# Patient Record
Sex: MALE | Race: ASIAN | NOT HISPANIC OR LATINO | Employment: UNEMPLOYED | URBAN - METROPOLITAN AREA
[De-identification: names, ages, dates, MRNs, and addresses within clinical notes are randomized per-mention and may not be internally consistent; named-entity substitution may affect disease eponyms.]

---

## 2019-09-10 ENCOUNTER — NON-PROVIDER VISIT (OUTPATIENT)
Dept: URGENT CARE | Facility: CLINIC | Age: 54
End: 2019-09-10

## 2019-09-10 DIAGNOSIS — Z11.1 ENCOUNTER FOR PPD TEST: ICD-10-CM

## 2019-09-10 PROCEDURE — 86580 TB INTRADERMAL TEST: CPT | Performed by: PHYSICIAN ASSISTANT

## 2019-09-13 ENCOUNTER — NON-PROVIDER VISIT (OUTPATIENT)
Dept: URGENT CARE | Facility: CLINIC | Age: 54
End: 2019-09-13

## 2019-09-13 ENCOUNTER — APPOINTMENT (OUTPATIENT)
Dept: RADIOLOGY | Facility: IMAGING CENTER | Age: 54
End: 2019-09-13
Attending: PHYSICIAN ASSISTANT

## 2019-09-13 DIAGNOSIS — R76.11 PPD POSITIVE: ICD-10-CM

## 2019-09-13 LAB — TB WHEAL 3D P 5 TU DIAM: NORMAL MM

## 2019-09-13 PROCEDURE — 71045 X-RAY EXAM CHEST 1 VIEW: CPT | Mod: TC | Performed by: PHYSICIAN ASSISTANT

## 2019-09-13 NOTE — PROGRESS NOTES
Subjective:      Thaddeus Adan is a 54 y.o. male who presents with PPD Reading            HPI    ROS       Objective:     There were no vitals taken for this visit.     Physical Exam            Assessment/Plan:     1. PPD positive    Patient presents today for PPD reading.  Patient is noted to have 60 mm of erythema and induration.  Chest x-ray ordered.  Chest x-ray is without acute cardiopulmonary process.    MAURO Chapman PA-C

## 2019-09-13 NOTE — PROGRESS NOTES
Subjective:      Thaddeus Adan is a 54 y.o. male who presents with PPD Reading            HPI    ROS       Objective:     There were no vitals taken for this visit.     Physical Exam            Assessment/Plan:     1. PPD positive  ***  - DX-CHEST-2 VIEWS; Future  Patient presents today for PPD reading.  Patient is noted to have 60 mm of erythema and induration.  Chest x-ray ordered.  Chest x-ray is without acute cardiopulmonary process.    MANUEL CurrieC

## 2019-09-13 NOTE — NON-PROVIDER
Thaddeus Adan is a 54 y.o. male here for a non-provider visit for PPD reading -- Step 1 of 1.      1.  Resulted in Epic under enter/edit results? Yes   2.  TB evaluation questionnaire scanned into chart and original given to patient?Yes      3. Was induration greater than 0 mm? Yes, verified by Provider: 16mm.

## 2019-09-13 NOTE — PROGRESS NOTES
Subjective:      Thaddeus Adan is a 54 y.o. male who presents with No chief complaint on file.            HPI    ROS       Objective:     There were no vitals taken for this visit.     Physical Exam            Assessment/Plan:     1. PPD positive      Patient presents today for PPD reading.  Patient is noted to have 60 mm of erythema and induration.  Chest x-ray ordered.  - DX-CHEST-2 VIEWS; Future      MANUEL CurrieC

## 2019-12-01 ENCOUNTER — HOSPITAL ENCOUNTER (INPATIENT)
Facility: MEDICAL CENTER | Age: 54
LOS: 3 days | DRG: 897 | End: 2019-12-06
Attending: EMERGENCY MEDICINE | Admitting: INTERNAL MEDICINE

## 2019-12-01 ENCOUNTER — APPOINTMENT (OUTPATIENT)
Dept: RADIOLOGY | Facility: MEDICAL CENTER | Age: 54
DRG: 897 | End: 2019-12-01
Attending: INTERNAL MEDICINE

## 2019-12-01 ENCOUNTER — APPOINTMENT (OUTPATIENT)
Dept: RADIOLOGY | Facility: MEDICAL CENTER | Age: 54
DRG: 897 | End: 2019-12-01
Attending: EMERGENCY MEDICINE

## 2019-12-01 DIAGNOSIS — R73.9 HYPERGLYCEMIA: ICD-10-CM

## 2019-12-01 DIAGNOSIS — R07.9 CHEST PAIN WITH MODERATE RISK FOR CARDIAC ETIOLOGY: ICD-10-CM

## 2019-12-01 DIAGNOSIS — F10.929 ACUTE ALCOHOLIC INTOXICATION WITH COMPLICATION (HCC): ICD-10-CM

## 2019-12-01 LAB
ALBUMIN SERPL BCP-MCNC: 3.6 G/DL (ref 3.2–4.9)
ALBUMIN/GLOB SERPL: 1.2 G/DL
ALP SERPL-CCNC: 115 U/L (ref 30–99)
ALT SERPL-CCNC: 159 U/L (ref 2–50)
ANION GAP SERPL CALC-SCNC: 18 MMOL/L (ref 0–11.9)
AST SERPL-CCNC: 289 U/L (ref 12–45)
BASOPHILS # BLD AUTO: 0.9 % (ref 0–1.8)
BASOPHILS # BLD: 0.05 K/UL (ref 0–0.12)
BILIRUB SERPL-MCNC: 1 MG/DL (ref 0.1–1.5)
BUN SERPL-MCNC: 10 MG/DL (ref 8–22)
CALCIUM SERPL-MCNC: 8.6 MG/DL (ref 8.5–10.5)
CHLORIDE SERPL-SCNC: 92 MMOL/L (ref 96–112)
CO2 SERPL-SCNC: 21 MMOL/L (ref 20–33)
CREAT SERPL-MCNC: 0.92 MG/DL (ref 0.5–1.4)
D DIMER PPP IA.FEU-MCNC: 0.56 UG/ML (FEU) (ref 0–0.5)
EKG IMPRESSION: NORMAL
EOSINOPHIL # BLD AUTO: 0.19 K/UL (ref 0–0.51)
EOSINOPHIL NFR BLD: 3.4 % (ref 0–6.9)
ERYTHROCYTE [DISTWIDTH] IN BLOOD BY AUTOMATED COUNT: 38.3 FL (ref 35.9–50)
ETHANOL BLD-MCNC: 0.29 G/DL
GLOBULIN SER CALC-MCNC: 2.9 G/DL (ref 1.9–3.5)
GLUCOSE BLD-MCNC: 251 MG/DL (ref 65–99)
GLUCOSE SERPL-MCNC: 293 MG/DL (ref 65–99)
HCT VFR BLD AUTO: 37.9 % (ref 42–52)
HGB BLD-MCNC: 13.5 G/DL (ref 14–18)
IMM GRANULOCYTES # BLD AUTO: 0.02 K/UL (ref 0–0.11)
IMM GRANULOCYTES NFR BLD AUTO: 0.4 % (ref 0–0.9)
LYMPHOCYTES # BLD AUTO: 1.37 K/UL (ref 1–4.8)
LYMPHOCYTES NFR BLD: 24.2 % (ref 22–41)
MCH RBC QN AUTO: 33.7 PG (ref 27–33)
MCHC RBC AUTO-ENTMCNC: 35.6 G/DL (ref 33.7–35.3)
MCV RBC AUTO: 94.5 FL (ref 81.4–97.8)
MONOCYTES # BLD AUTO: 0.74 K/UL (ref 0–0.85)
MONOCYTES NFR BLD AUTO: 13.1 % (ref 0–13.4)
NEUTROPHILS # BLD AUTO: 3.3 K/UL (ref 1.82–7.42)
NEUTROPHILS NFR BLD: 58 % (ref 44–72)
NRBC # BLD AUTO: 0 K/UL
NRBC BLD-RTO: 0 /100 WBC
PLATELET # BLD AUTO: 129 K/UL (ref 164–446)
PMV BLD AUTO: 9.4 FL (ref 9–12.9)
POTASSIUM SERPL-SCNC: 3.4 MMOL/L (ref 3.6–5.5)
PROT SERPL-MCNC: 6.5 G/DL (ref 6–8.2)
RBC # BLD AUTO: 4.01 M/UL (ref 4.7–6.1)
SODIUM SERPL-SCNC: 131 MMOL/L (ref 135–145)
TROPONIN T SERPL-MCNC: 10 NG/L (ref 6–19)
TROPONIN T SERPL-MCNC: 14 NG/L (ref 6–19)
WBC # BLD AUTO: 5.7 K/UL (ref 4.8–10.8)

## 2019-12-01 PROCEDURE — 85379 FIBRIN DEGRADATION QUANT: CPT

## 2019-12-01 PROCEDURE — G0378 HOSPITAL OBSERVATION PER HR: HCPCS

## 2019-12-01 PROCEDURE — 99220 PR INITIAL OBSERVATION CARE,LEVL III: CPT | Performed by: INTERNAL MEDICINE

## 2019-12-01 PROCEDURE — HZ2ZZZZ DETOXIFICATION SERVICES FOR SUBSTANCE ABUSE TREATMENT: ICD-10-PCS | Performed by: INTERNAL MEDICINE

## 2019-12-01 PROCEDURE — 99285 EMERGENCY DEPT VISIT HI MDM: CPT

## 2019-12-01 PROCEDURE — 80307 DRUG TEST PRSMV CHEM ANLYZR: CPT

## 2019-12-01 PROCEDURE — 700102 HCHG RX REV CODE 250 W/ 637 OVERRIDE(OP): Performed by: INTERNAL MEDICINE

## 2019-12-01 PROCEDURE — 96372 THER/PROPH/DIAG INJ SC/IM: CPT

## 2019-12-01 PROCEDURE — 80053 COMPREHEN METABOLIC PANEL: CPT

## 2019-12-01 PROCEDURE — 71045 X-RAY EXAM CHEST 1 VIEW: CPT

## 2019-12-01 PROCEDURE — 84484 ASSAY OF TROPONIN QUANT: CPT

## 2019-12-01 PROCEDURE — 80074 ACUTE HEPATITIS PANEL: CPT

## 2019-12-01 PROCEDURE — 96374 THER/PROPH/DIAG INJ IV PUSH: CPT

## 2019-12-01 PROCEDURE — 36415 COLL VENOUS BLD VENIPUNCTURE: CPT

## 2019-12-01 PROCEDURE — 76705 ECHO EXAM OF ABDOMEN: CPT

## 2019-12-01 PROCEDURE — 93005 ELECTROCARDIOGRAM TRACING: CPT

## 2019-12-01 PROCEDURE — 93005 ELECTROCARDIOGRAM TRACING: CPT | Performed by: EMERGENCY MEDICINE

## 2019-12-01 PROCEDURE — 85025 COMPLETE CBC W/AUTO DIFF WBC: CPT

## 2019-12-01 PROCEDURE — 700101 HCHG RX REV CODE 250: Performed by: INTERNAL MEDICINE

## 2019-12-01 PROCEDURE — 93005 ELECTROCARDIOGRAM TRACING: CPT | Performed by: INTERNAL MEDICINE

## 2019-12-01 PROCEDURE — 82962 GLUCOSE BLOOD TEST: CPT

## 2019-12-01 RX ORDER — HEPARIN SODIUM 5000 [USP'U]/ML
5000 INJECTION, SOLUTION INTRAVENOUS; SUBCUTANEOUS EVERY 8 HOURS
Status: DISCONTINUED | OUTPATIENT
Start: 2019-12-01 | End: 2019-12-06 | Stop reason: HOSPADM

## 2019-12-01 RX ORDER — ASPIRIN 81 MG/1
324 TABLET, CHEWABLE ORAL DAILY
Status: DISCONTINUED | OUTPATIENT
Start: 2019-12-02 | End: 2019-12-03

## 2019-12-01 RX ORDER — LOSARTAN POTASSIUM 50 MG/1
50 TABLET ORAL DAILY
Status: ON HOLD | COMMUNITY
End: 2019-12-06

## 2019-12-01 RX ORDER — LOSARTAN POTASSIUM 50 MG/1
50 TABLET ORAL DAILY
Status: DISCONTINUED | OUTPATIENT
Start: 2019-12-02 | End: 2019-12-02

## 2019-12-01 RX ORDER — LORAZEPAM 1 MG/1
0.5 TABLET ORAL EVERY 4 HOURS PRN
Status: DISCONTINUED | OUTPATIENT
Start: 2019-12-01 | End: 2019-12-06 | Stop reason: HOSPADM

## 2019-12-01 RX ORDER — ASPIRIN 325 MG
325 TABLET ORAL DAILY
Status: DISCONTINUED | OUTPATIENT
Start: 2019-12-02 | End: 2019-12-03

## 2019-12-01 RX ORDER — LORAZEPAM 2 MG/ML
1 INJECTION INTRAMUSCULAR
Status: DISCONTINUED | OUTPATIENT
Start: 2019-12-01 | End: 2019-12-06 | Stop reason: HOSPADM

## 2019-12-01 RX ORDER — LORAZEPAM 2 MG/ML
1.5 INJECTION INTRAMUSCULAR
Status: DISCONTINUED | OUTPATIENT
Start: 2019-12-01 | End: 2019-12-06 | Stop reason: HOSPADM

## 2019-12-01 RX ORDER — LORAZEPAM 1 MG/1
1 TABLET ORAL EVERY 4 HOURS PRN
Status: DISCONTINUED | OUTPATIENT
Start: 2019-12-01 | End: 2019-12-06 | Stop reason: HOSPADM

## 2019-12-01 RX ORDER — ASPIRIN 300 MG/1
300 SUPPOSITORY RECTAL DAILY
Status: DISCONTINUED | OUTPATIENT
Start: 2019-12-02 | End: 2019-12-03

## 2019-12-01 RX ORDER — LORAZEPAM 2 MG/ML
0.5 INJECTION INTRAMUSCULAR EVERY 4 HOURS PRN
Status: DISCONTINUED | OUTPATIENT
Start: 2019-12-01 | End: 2019-12-06 | Stop reason: HOSPADM

## 2019-12-01 RX ORDER — THIAMINE MONONITRATE (VIT B1) 100 MG
100 TABLET ORAL DAILY
Status: COMPLETED | OUTPATIENT
Start: 2019-12-02 | End: 2019-12-05

## 2019-12-01 RX ORDER — LOSARTAN POTASSIUM 25 MG/1
25 TABLET ORAL ONCE
Status: COMPLETED | OUTPATIENT
Start: 2019-12-02 | End: 2019-12-02

## 2019-12-01 RX ORDER — FOLIC ACID 1 MG/1
1 TABLET ORAL DAILY
Status: COMPLETED | OUTPATIENT
Start: 2019-12-02 | End: 2019-12-05

## 2019-12-01 RX ORDER — LORAZEPAM 2 MG/1
4 TABLET ORAL
Status: DISCONTINUED | OUTPATIENT
Start: 2019-12-01 | End: 2019-12-06 | Stop reason: HOSPADM

## 2019-12-01 RX ORDER — LORAZEPAM 2 MG/ML
2 INJECTION INTRAMUSCULAR
Status: DISCONTINUED | OUTPATIENT
Start: 2019-12-01 | End: 2019-12-06 | Stop reason: HOSPADM

## 2019-12-01 RX ORDER — LABETALOL HYDROCHLORIDE 5 MG/ML
10 INJECTION, SOLUTION INTRAVENOUS EVERY 6 HOURS PRN
Status: DISCONTINUED | OUTPATIENT
Start: 2019-12-01 | End: 2019-12-02

## 2019-12-01 RX ORDER — LORAZEPAM 2 MG/1
2 TABLET ORAL
Status: DISCONTINUED | OUTPATIENT
Start: 2019-12-01 | End: 2019-12-06 | Stop reason: HOSPADM

## 2019-12-01 RX ADMIN — INSULIN HUMAN 3 UNITS: 100 INJECTION, SOLUTION PARENTERAL at 21:58

## 2019-12-01 RX ADMIN — THIAMINE HYDROCHLORIDE: 100 INJECTION, SOLUTION INTRAMUSCULAR; INTRAVENOUS at 18:45

## 2019-12-01 SDOH — HEALTH STABILITY: MENTAL HEALTH: HOW OFTEN DO YOU HAVE 6 OR MORE DRINKS ON ONE OCCASION?: PATIENT DECLINED

## 2019-12-01 SDOH — ECONOMIC STABILITY: TRANSPORTATION INSECURITY
IN THE PAST 12 MONTHS, HAS LACK OF TRANSPORTATION KEPT YOU FROM MEETINGS, WORK, OR FROM GETTING THINGS NEEDED FOR DAILY LIVING?: PATIENT DECLINED

## 2019-12-01 SDOH — HEALTH STABILITY: MENTAL HEALTH: HOW MANY STANDARD DRINKS CONTAINING ALCOHOL DO YOU HAVE ON A TYPICAL DAY?: PATIENT DECLINED

## 2019-12-01 SDOH — HEALTH STABILITY: MENTAL HEALTH: HOW OFTEN DO YOU HAVE A DRINK CONTAINING ALCOHOL?: PATIENT DECLINED

## 2019-12-01 SDOH — ECONOMIC STABILITY: FOOD INSECURITY: WITHIN THE PAST 12 MONTHS, YOU WORRIED THAT YOUR FOOD WOULD RUN OUT BEFORE YOU GOT MONEY TO BUY MORE.: PATIENT DECLINED

## 2019-12-01 SDOH — ECONOMIC STABILITY: TRANSPORTATION INSECURITY
IN THE PAST 12 MONTHS, HAS THE LACK OF TRANSPORTATION KEPT YOU FROM MEDICAL APPOINTMENTS OR FROM GETTING MEDICATIONS?: PATIENT DECLINED

## 2019-12-01 SDOH — ECONOMIC STABILITY: FOOD INSECURITY: WITHIN THE PAST 12 MONTHS, THE FOOD YOU BOUGHT JUST DIDN'T LAST AND YOU DIDN'T HAVE MONEY TO GET MORE.: PATIENT DECLINED

## 2019-12-01 ASSESSMENT — LIFESTYLE VARIABLES
AGITATION: NORMAL ACTIVITY
AVERAGE NUMBER OF DAYS PER WEEK YOU HAVE A DRINK CONTAINING ALCOHOL: 3
HAVE YOU EVER FELT YOU SHOULD CUT DOWN ON YOUR DRINKING: YES
EVER FELT BAD OR GUILTY ABOUT YOUR DRINKING: YES
ALCOHOL_USE: YES
TOTAL SCORE: 2
HEADACHE, FULLNESS IN HEAD: NOT PRESENT
NAUSEA AND VOMITING: NO NAUSEA AND NO VOMITING
EVER_SMOKED: YES
DOES PATIENT WANT TO STOP DRINKING: YES
HOW MANY TIMES IN THE PAST YEAR HAVE YOU HAD 5 OR MORE DRINKS IN A DAY: 30
PACK_YEARS: 25
PAROXYSMAL SWEATS: NO SWEAT VISIBLE
ORIENTATION AND CLOUDING OF SENSORIUM: ORIENTED AND CAN DO SERIAL ADDITIONS
TOTAL SCORE: 2
TOTAL SCORE: 1
EVER HAD A DRINK FIRST THING IN THE MORNING TO STEADY YOUR NERVES TO GET RID OF A HANGOVER: NO
TOTAL SCORE: 2
ANXIETY: MILDLY ANXIOUS
TREMOR: NO TREMOR
VISUAL DISTURBANCES: NOT PRESENT
CONSUMPTION TOTAL: POSITIVE
ON A TYPICAL DAY WHEN YOU DRINK ALCOHOL HOW MANY DRINKS DO YOU HAVE: 12
HAVE PEOPLE ANNOYED YOU BY CRITICIZING YOUR DRINKING: NO
AUDITORY DISTURBANCES: NOT PRESENT
DOES PATIENT WANT TO TALK TO SOMEONE ABOUT QUITTING: YES

## 2019-12-01 ASSESSMENT — COPD QUESTIONNAIRES
DO YOU EVER COUGH UP ANY MUCUS OR PHLEGM?: NO/ONLY WITH OCCASIONAL COLDS OR INFECTIONS
IN THE PAST 12 MONTHS DO YOU DO LESS THAN YOU USED TO BECAUSE OF YOUR BREATHING PROBLEMS: DISAGREE/UNSURE
HAVE YOU SMOKED AT LEAST 100 CIGARETTES IN YOUR ENTIRE LIFE: NO/DON'T KNOW
COPD SCREENING SCORE: 1
DURING THE PAST 4 WEEKS HOW MUCH DID YOU FEEL SHORT OF BREATH: NONE/LITTLE OF THE TIME

## 2019-12-01 ASSESSMENT — COGNITIVE AND FUNCTIONAL STATUS - GENERAL
DAILY ACTIVITIY SCORE: 24
SUGGESTED CMS G CODE MODIFIER MOBILITY: CH
SUGGESTED CMS G CODE MODIFIER DAILY ACTIVITY: CH
MOBILITY SCORE: 24

## 2019-12-01 ASSESSMENT — PATIENT HEALTH QUESTIONNAIRE - PHQ9
7. TROUBLE CONCENTRATING ON THINGS, SUCH AS READING THE NEWSPAPER OR WATCHING TELEVISION: NOT AT ALL
SUM OF ALL RESPONSES TO PHQ9 QUESTIONS 1 AND 2: 2
4. FEELING TIRED OR HAVING LITTLE ENERGY: NOT AT ALL
8. MOVING OR SPEAKING SO SLOWLY THAT OTHER PEOPLE COULD HAVE NOTICED. OR THE OPPOSITE, BEING SO FIGETY OR RESTLESS THAT YOU HAVE BEEN MOVING AROUND A LOT MORE THAN USUAL: NOT AT ALL
9. THOUGHTS THAT YOU WOULD BE BETTER OFF DEAD, OR OF HURTING YOURSELF: NOT AT ALL
3. TROUBLE FALLING OR STAYING ASLEEP OR SLEEPING TOO MUCH: NOT AT ALL
SUM OF ALL RESPONSES TO PHQ QUESTIONS 1-9: 3
5. POOR APPETITE OR OVEREATING: NOT AT ALL
6. FEELING BAD ABOUT YOURSELF - OR THAT YOU ARE A FAILURE OR HAVE LET YOURSELF OR YOUR FAMILY DOWN: SEVERAL DAYS
2. FEELING DOWN, DEPRESSED, IRRITABLE, OR HOPELESS: SEVERAL DAYS
1. LITTLE INTEREST OR PLEASURE IN DOING THINGS: SEVERAL DAYS

## 2019-12-01 ASSESSMENT — PAIN DESCRIPTION - DESCRIPTORS: DESCRIPTORS: BURNING

## 2019-12-01 NOTE — ED TRIAGE NOTES
Pt presents to ED via EMS from [facility] for chest pain x 1 hour. 324 asa and 0.4 nitro was given. Pain is now 1/10. Pt refusing to answer any questions at this time. Pt will only point to mouth and ask for water. Pt will nod yes and no to basic questions. Limited assessment obtainable.

## 2019-12-02 ENCOUNTER — APPOINTMENT (OUTPATIENT)
Dept: CARDIOLOGY | Facility: MEDICAL CENTER | Age: 54
DRG: 897 | End: 2019-12-02
Attending: INTERNAL MEDICINE

## 2019-12-02 ENCOUNTER — APPOINTMENT (OUTPATIENT)
Dept: RADIOLOGY | Facility: MEDICAL CENTER | Age: 54
DRG: 897 | End: 2019-12-02
Attending: INTERNAL MEDICINE

## 2019-12-02 PROBLEM — R73.9 HYPERGLYCEMIA: Status: ACTIVE | Noted: 2019-12-02

## 2019-12-02 PROBLEM — R74.8 ELEVATED LIVER ENZYMES: Status: ACTIVE | Noted: 2019-12-02

## 2019-12-02 PROBLEM — R07.9 CHEST PAIN: Status: ACTIVE | Noted: 2019-12-02

## 2019-12-02 PROBLEM — F10.10 ALCOHOL ABUSE: Status: ACTIVE | Noted: 2019-12-02

## 2019-12-02 LAB
EKG IMPRESSION: NORMAL
EKG IMPRESSION: NORMAL
EST. AVERAGE GLUCOSE BLD GHB EST-MCNC: 243 MG/DL
GLUCOSE BLD-MCNC: 242 MG/DL (ref 65–99)
GLUCOSE BLD-MCNC: 244 MG/DL (ref 65–99)
GLUCOSE BLD-MCNC: 257 MG/DL (ref 65–99)
GLUCOSE BLD-MCNC: 259 MG/DL (ref 65–99)
HAV IGM SERPL QL IA: NEGATIVE
HBA1C MFR BLD: 10.1 % (ref 0–5.6)
HBV CORE IGM SER QL: NEGATIVE
HBV SURFACE AG SER QL: NEGATIVE
HCV AB SER QL: NEGATIVE
LIPASE SERPL-CCNC: 62 U/L (ref 11–82)
LV EJECT FRACT  99904: 65
LV EJECT FRACT MOD 2C 99903: 69.34
LV EJECT FRACT MOD 4C 99902: 67.55
LV EJECT FRACT MOD BP 99901: 69.05
MAGNESIUM SERPL-MCNC: 1.5 MG/DL (ref 1.5–2.5)
PHOSPHATE SERPL-MCNC: 2.3 MG/DL (ref 2.5–4.5)

## 2019-12-02 PROCEDURE — A9270 NON-COVERED ITEM OR SERVICE: HCPCS | Performed by: INTERNAL MEDICINE

## 2019-12-02 PROCEDURE — G0378 HOSPITAL OBSERVATION PER HR: HCPCS

## 2019-12-02 PROCEDURE — 700102 HCHG RX REV CODE 250 W/ 637 OVERRIDE(OP): Performed by: INTERNAL MEDICINE

## 2019-12-02 PROCEDURE — 83735 ASSAY OF MAGNESIUM: CPT

## 2019-12-02 PROCEDURE — 82962 GLUCOSE BLOOD TEST: CPT | Mod: 91

## 2019-12-02 PROCEDURE — 96375 TX/PRO/DX INJ NEW DRUG ADDON: CPT

## 2019-12-02 PROCEDURE — 83036 HEMOGLOBIN GLYCOSYLATED A1C: CPT

## 2019-12-02 PROCEDURE — 700111 HCHG RX REV CODE 636 W/ 250 OVERRIDE (IP): Performed by: INTERNAL MEDICINE

## 2019-12-02 PROCEDURE — 99226 PR SUBSEQUENT OBSERVATION CARE,LEVEL III: CPT | Performed by: INTERNAL MEDICINE

## 2019-12-02 PROCEDURE — 93010 ELECTROCARDIOGRAM REPORT: CPT | Performed by: INTERNAL MEDICINE

## 2019-12-02 PROCEDURE — 700105 HCHG RX REV CODE 258: Performed by: INTERNAL MEDICINE

## 2019-12-02 PROCEDURE — 84100 ASSAY OF PHOSPHORUS: CPT

## 2019-12-02 PROCEDURE — 93010 ELECTROCARDIOGRAM REPORT: CPT | Mod: 76 | Performed by: INTERNAL MEDICINE

## 2019-12-02 PROCEDURE — 93306 TTE W/DOPPLER COMPLETE: CPT

## 2019-12-02 PROCEDURE — 36415 COLL VENOUS BLD VENIPUNCTURE: CPT

## 2019-12-02 PROCEDURE — 93306 TTE W/DOPPLER COMPLETE: CPT | Mod: 26 | Performed by: INTERNAL MEDICINE

## 2019-12-02 PROCEDURE — 83690 ASSAY OF LIPASE: CPT

## 2019-12-02 PROCEDURE — 93005 ELECTROCARDIOGRAM TRACING: CPT | Performed by: INTERNAL MEDICINE

## 2019-12-02 PROCEDURE — 96372 THER/PROPH/DIAG INJ SC/IM: CPT

## 2019-12-02 RX ORDER — OMEPRAZOLE 20 MG/1
20 CAPSULE, DELAYED RELEASE ORAL DAILY
Status: DISCONTINUED | OUTPATIENT
Start: 2019-12-02 | End: 2019-12-06 | Stop reason: HOSPADM

## 2019-12-02 RX ORDER — LOSARTAN POTASSIUM 50 MG/1
100 TABLET ORAL DAILY
Status: DISCONTINUED | OUTPATIENT
Start: 2019-12-03 | End: 2019-12-06 | Stop reason: HOSPADM

## 2019-12-02 RX ORDER — SODIUM CHLORIDE, SODIUM LACTATE, POTASSIUM CHLORIDE, CALCIUM CHLORIDE 600; 310; 30; 20 MG/100ML; MG/100ML; MG/100ML; MG/100ML
INJECTION, SOLUTION INTRAVENOUS CONTINUOUS
Status: DISCONTINUED | OUTPATIENT
Start: 2019-12-02 | End: 2019-12-05

## 2019-12-02 RX ORDER — ENALAPRILAT 1.25 MG/ML
1.25 INJECTION INTRAVENOUS EVERY 6 HOURS PRN
Status: DISCONTINUED | OUTPATIENT
Start: 2019-12-02 | End: 2019-12-06 | Stop reason: HOSPADM

## 2019-12-02 RX ADMIN — SODIUM CHLORIDE, POTASSIUM CHLORIDE, SODIUM LACTATE AND CALCIUM CHLORIDE: 600; 310; 30; 20 INJECTION, SOLUTION INTRAVENOUS at 02:35

## 2019-12-02 RX ADMIN — FOLIC ACID 1 MG: 1 TABLET ORAL at 05:21

## 2019-12-02 RX ADMIN — OMEPRAZOLE 20 MG: 20 CAPSULE, DELAYED RELEASE ORAL at 05:21

## 2019-12-02 RX ADMIN — INSULIN HUMAN 3 UNITS: 100 INJECTION, SOLUTION PARENTERAL at 20:25

## 2019-12-02 RX ADMIN — THERA TABS 1 TABLET: TAB at 05:21

## 2019-12-02 RX ADMIN — LOSARTAN POTASSIUM 50 MG: 50 TABLET, FILM COATED ORAL at 05:21

## 2019-12-02 RX ADMIN — HEPARIN SODIUM 5000 UNITS: 5000 INJECTION, SOLUTION INTRAVENOUS; SUBCUTANEOUS at 20:21

## 2019-12-02 RX ADMIN — OMEPRAZOLE 20 MG: 20 CAPSULE, DELAYED RELEASE ORAL at 02:30

## 2019-12-02 RX ADMIN — ENALAPRILAT 1.25 MG: 1.25 INJECTION INTRAVENOUS at 01:40

## 2019-12-02 RX ADMIN — INSULIN HUMAN 3 UNITS: 100 INJECTION, SOLUTION PARENTERAL at 11:54

## 2019-12-02 RX ADMIN — HEPARIN SODIUM 5000 UNITS: 5000 INJECTION, SOLUTION INTRAVENOUS; SUBCUTANEOUS at 14:17

## 2019-12-02 RX ADMIN — SODIUM CHLORIDE, POTASSIUM CHLORIDE, SODIUM LACTATE AND CALCIUM CHLORIDE: 600; 310; 30; 20 INJECTION, SOLUTION INTRAVENOUS at 10:50

## 2019-12-02 RX ADMIN — Medication 100 MG: at 05:21

## 2019-12-02 RX ADMIN — DIBASIC SODIUM PHOSPHATE, MONOBASIC POTASSIUM PHOSPHATE AND MONOBASIC SODIUM PHOSPHATE 2 TABLET: 852; 155; 130 TABLET ORAL at 08:46

## 2019-12-02 RX ADMIN — DIBASIC SODIUM PHOSPHATE, MONOBASIC POTASSIUM PHOSPHATE AND MONOBASIC SODIUM PHOSPHATE 2 TABLET: 852; 155; 130 TABLET ORAL at 17:07

## 2019-12-02 RX ADMIN — LIDOCAINE HYDROCHLORIDE 30 ML: 20 SOLUTION OROPHARYNGEAL at 02:30

## 2019-12-02 RX ADMIN — DIBASIC SODIUM PHOSPHATE, MONOBASIC POTASSIUM PHOSPHATE AND MONOBASIC SODIUM PHOSPHATE 2 TABLET: 852; 155; 130 TABLET ORAL at 11:54

## 2019-12-02 RX ADMIN — SODIUM CHLORIDE, POTASSIUM CHLORIDE, SODIUM LACTATE AND CALCIUM CHLORIDE: 600; 310; 30; 20 INJECTION, SOLUTION INTRAVENOUS at 20:23

## 2019-12-02 RX ADMIN — LORAZEPAM 0.5 MG: 1 TABLET ORAL at 17:24

## 2019-12-02 RX ADMIN — INSULIN HUMAN 2 UNITS: 100 INJECTION, SOLUTION PARENTERAL at 17:10

## 2019-12-02 RX ADMIN — LORAZEPAM 1 MG: 1 TABLET ORAL at 20:22

## 2019-12-02 RX ADMIN — LOSARTAN POTASSIUM 25 MG: 25 TABLET ORAL at 00:05

## 2019-12-02 RX ADMIN — ASPIRIN 325 MG: 325 TABLET, FILM COATED ORAL at 05:21

## 2019-12-02 RX ADMIN — LORAZEPAM 1 MG: 1 TABLET ORAL at 14:16

## 2019-12-02 RX ADMIN — INSULIN HUMAN 2 UNITS: 100 INJECTION, SOLUTION PARENTERAL at 05:17

## 2019-12-02 ASSESSMENT — ENCOUNTER SYMPTOMS
ABDOMINAL PAIN: 0
TINGLING: 0
PALPITATIONS: 0
SPUTUM PRODUCTION: 0
CONSTIPATION: 0
HEADACHES: 0
ORTHOPNEA: 0
BACK PAIN: 0
DEPRESSION: 1
HALLUCINATIONS: 0
TREMORS: 0
EYE PAIN: 0
MYALGIAS: 0
PHOTOPHOBIA: 0
CHILLS: 0
VOMITING: 0
BLURRED VISION: 0
WEIGHT LOSS: 0
SPEECH CHANGE: 0
SHORTNESS OF BREATH: 1
NECK PAIN: 0
DIZZINESS: 0
SHORTNESS OF BREATH: 0
WEAKNESS: 1
DIARRHEA: 0
FOCAL WEAKNESS: 0
NAUSEA: 0
SENSORY CHANGE: 0
NERVOUS/ANXIOUS: 1
FEVER: 0
COUGH: 0
DOUBLE VISION: 0

## 2019-12-02 ASSESSMENT — LIFESTYLE VARIABLES
NAUSEA AND VOMITING: NO NAUSEA AND NO VOMITING
ANXIETY: *
ORIENTATION AND CLOUDING OF SENSORIUM: ORIENTED AND CAN DO SERIAL ADDITIONS
ANXIETY: MILDLY ANXIOUS
AGITATION: SOMEWHAT MORE THAN NORMAL ACTIVITY
NAUSEA AND VOMITING: NO NAUSEA AND NO VOMITING
AUDITORY DISTURBANCES: NOT PRESENT
NAUSEA AND VOMITING: MILD NAUSEA WITH NO VOMITING
SUBSTANCE_ABUSE: 1
AUDITORY DISTURBANCES: NOT PRESENT
NAUSEA AND VOMITING: NO NAUSEA AND NO VOMITING
ORIENTATION AND CLOUDING OF SENSORIUM: ORIENTED AND CAN DO SERIAL ADDITIONS
HEADACHE, FULLNESS IN HEAD: NOT PRESENT
ANXIETY: MILDLY ANXIOUS
AUDITORY DISTURBANCES: NOT PRESENT
PAROXYSMAL SWEATS: BARELY PERCEPTIBLE SWEATING. PALMS MOIST
TOTAL SCORE: 1
PAROXYSMAL SWEATS: NO SWEAT VISIBLE
ORIENTATION AND CLOUDING OF SENSORIUM: ORIENTED AND CAN DO SERIAL ADDITIONS
HEADACHE, FULLNESS IN HEAD: NOT PRESENT
PAROXYSMAL SWEATS: *
HEADACHE, FULLNESS IN HEAD: NOT PRESENT
VISUAL DISTURBANCES: NOT PRESENT
NAUSEA AND VOMITING: *
AUDITORY DISTURBANCES: VERY MILD HARSHNESS OR ABILITY TO FRIGHTEN
NAUSEA AND VOMITING: *
ANXIETY: NO ANXIETY (AT EASE)
AUDITORY DISTURBANCES: NOT PRESENT
VISUAL DISTURBANCES: NOT PRESENT
TOTAL SCORE: 10
ORIENTATION AND CLOUDING OF SENSORIUM: ORIENTED AND CAN DO SERIAL ADDITIONS
TOTAL SCORE: 4
AUDITORY DISTURBANCES: NOT PRESENT
ANXIETY: *
HEADACHE, FULLNESS IN HEAD: NOT PRESENT
AGITATION: NORMAL ACTIVITY
ANXIETY: MILDLY ANXIOUS
VISUAL DISTURBANCES: VERY MILD SENSITIVITY
TREMOR: *
TOTAL SCORE: 4
ORIENTATION AND CLOUDING OF SENSORIUM: ORIENTED AND CAN DO SERIAL ADDITIONS
TREMOR: TREMOR NOT VISIBLE BUT CAN BE FELT, FINGERTIP TO FINGERTIP
ANXIETY: MILDLY ANXIOUS
HEADACHE, FULLNESS IN HEAD: NOT PRESENT
AGITATION: NORMAL ACTIVITY
AGITATION: *
TOTAL SCORE: 1
PAROXYSMAL SWEATS: BARELY PERCEPTIBLE SWEATING. PALMS MOIST
HEADACHE, FULLNESS IN HEAD: NOT PRESENT
PAROXYSMAL SWEATS: BARELY PERCEPTIBLE SWEATING. PALMS MOIST
TREMOR: *
TREMOR: NO TREMOR
VISUAL DISTURBANCES: NOT PRESENT
AGITATION: NORMAL ACTIVITY
AUDITORY DISTURBANCES: NOT PRESENT
HEADACHE, FULLNESS IN HEAD: NOT PRESENT
VISUAL DISTURBANCES: NOT PRESENT
TREMOR: TREMOR NOT VISIBLE BUT CAN BE FELT, FINGERTIP TO FINGERTIP
TREMOR: TREMOR NOT VISIBLE BUT CAN BE FELT, FINGERTIP TO FINGERTIP
TOTAL SCORE: 6
TOTAL SCORE: 10
ORIENTATION AND CLOUDING OF SENSORIUM: ORIENTED AND CAN DO SERIAL ADDITIONS
PAROXYSMAL SWEATS: *
AGITATION: NORMAL ACTIVITY
PAROXYSMAL SWEATS: NO SWEAT VISIBLE
AGITATION: NORMAL ACTIVITY
VISUAL DISTURBANCES: MILD SENSITIVITY
VISUAL DISTURBANCES: NOT PRESENT
ORIENTATION AND CLOUDING OF SENSORIUM: ORIENTED AND CAN DO SERIAL ADDITIONS
NAUSEA AND VOMITING: MILD NAUSEA WITH NO VOMITING
TREMOR: NO TREMOR

## 2019-12-02 NOTE — CARE PLAN
Problem: Safety  Goal: Will remain free from injury  Outcome: PROGRESSING AS EXPECTED  Goal: Will remain free from falls  Outcome: PROGRESSING AS EXPECTED   Fall precautions in place. Bed locked and in lowest position. Call light and personal belongings within reach. Pt verbalized understanding of call light use.   Problem: Knowledge Deficit  Goal: Knowledge of disease process/condition, treatment plan, diagnostic tests, and medications will improve  Outcome: PROGRESSING AS EXPECTED   Updated pt on POC. Pt verbalized understanding.

## 2019-12-02 NOTE — PROGRESS NOTES
Updated Dr. Smith on patient's status.      Patient initially complaining of 2-4/10 CP, EKG completed (Negative), 2nd trop drawn 14, patient resting now.  Slightly hypertensive.      Patient will be given BP medications, Per Dr. Smith.      Juan Porras

## 2019-12-02 NOTE — PROGRESS NOTES
Hospital Medicine Daily Progress Note    Date of Service  12/2/2019    Chief Complaint  54 y.o. male admitted 12/1/2019 with chest pain and ETOH WD    Hospital Course    see below      Interval Problem Update  CP-mild lower mid sternal pain. NO issues on tele. Trops are ok. Patient refuses stress test today due to fatigue.    ETOH WD-CIWA scores range from 5-10. No other new issues. Lyte abnormalities noted.     Consultants/Specialty  none    Code Status  fcfc    Disposition  TELE    Review of Systems  Review of Systems   Constitutional: Positive for malaise/fatigue. Negative for chills and fever.   Respiratory: Negative for shortness of breath.    Cardiovascular: Positive for chest pain. Negative for palpitations and leg swelling.   Gastrointestinal: Negative for abdominal pain and nausea.   Genitourinary: Negative for dysuria and urgency.   Neurological: Positive for weakness. Negative for tremors.   Psychiatric/Behavioral: Positive for depression.   All other systems reviewed and are negative.       Physical Exam  Temp:  [36.5 °C (97.7 °F)-38 °C (100.4 °F)] 37.2 °C (99 °F)  Pulse:  [] 120  Resp:  [15-20] 20  BP: (136-181)/() 166/93  SpO2:  [92 %-97 %] 94 %    Physical Exam  Vitals signs and nursing note reviewed.   Constitutional:       General: He is not in acute distress.     Appearance: He is well-developed.      Comments: Lethargic, but responds appropriately to questions  Feels tired and fatigued   HENT:      Head: Normocephalic and atraumatic.      Mouth/Throat:      Pharynx: No oropharyngeal exudate.   Eyes:      General: No scleral icterus.     Pupils: Pupils are equal, round, and reactive to light.   Neck:      Musculoskeletal: Normal range of motion and neck supple.      Thyroid: No thyromegaly.   Cardiovascular:      Rate and Rhythm: Regular rhythm. Tachycardia present.      Heart sounds: Normal heart sounds. No murmur.   Pulmonary:      Effort: Pulmonary effort is normal. No respiratory  distress.      Breath sounds: Rales present. No wheezing.   Abdominal:      General: Bowel sounds are normal. There is no distension.      Palpations: Abdomen is soft.      Tenderness: There is tenderness (lower sternal area).   Musculoskeletal: Normal range of motion.         General: No tenderness.   Skin:     General: Skin is warm and dry.      Findings: No rash.   Neurological:      Mental Status: He is alert and oriented to person, place, and time.      Cranial Nerves: No cranial nerve deficit.         Fluids    Intake/Output Summary (Last 24 hours) at 12/2/2019 1423  Last data filed at 12/2/2019 0802  Gross per 24 hour   Intake 2459.58 ml   Output --   Net 2459.58 ml       Laboratory  Recent Labs     12/01/19  1547   WBC 5.7   RBC 4.01*   HEMOGLOBIN 13.5*   HEMATOCRIT 37.9*   MCV 94.5   MCH 33.7*   MCHC 35.6*   RDW 38.3   PLATELETCT 129*   MPV 9.4     Recent Labs     12/01/19  1547   SODIUM 131*   POTASSIUM 3.4*   CHLORIDE 92*   CO2 21   GLUCOSE 293*   BUN 10   CREATININE 0.92   CALCIUM 8.6                   Imaging  EC-ECHOCARDIOGRAM COMPLETE W/O CONT         US-RUQ   Final Result      1.  Sludge within the gallbladder. No gallstones or biliary ductal dilatation.      2.  Fatty change of the liver.      3.  Small right upper pole renal cyst.      DX-CHEST-PORTABLE (1 VIEW)   Final Result      No evidence of acute cardiopulmonary process.      NM-CARDIAC STRESS TEST    (Results Pending)        Assessment/Plan  Chest pain- (present on admission)  Assessment & Plan  -remains without Chest pain currently, etiology has a broad differential.   Serial troponin and EKG.  -ECHO pending  -NM cardiac stress test deferred until tomorrow  -no issues on tele, TROPS are ok    Hyperglycemia- (present on admission)  Assessment & Plan  -persistent, ?underlying DM  -start ISS, goal BS less than 180  -check A1c  -change diet to diabetic    Elevated liver enzymes- (present on admission)  Assessment & Plan  -asymptomatic, monitor  and trend daily, avoid obvious hepatotoxins  It most likely secondary to alcohol abuse.  RUQ US only with gallbladder sludge, otherwise unremarkable  -check HEP panel    Alcohol abuse- (present on admission)  Assessment & Plan  -continue CIWA, scores are up to 10 now  -lytes are abnormal, replace K+ & PO4(-) aggressively  -monitor closely  -MVI/thiamine, folate         VTE prophylaxis: heparin

## 2019-12-02 NOTE — PROGRESS NOTES
"Page out to Hospitalist Dr. Carlitos Ingram:    Patient 4/10 epigastric pain, 2nd EKG negative.      BP (!) 167/94   Pulse (!) 119   Temp 38 °C (100.4 °F) (Oral)   Resp 18   Ht 1.727 m (5' 8\")   Wt 71.9 kg (158 lb 8.2 oz)   SpO2 93%   BMI 24.10 kg/m²     Orders placed.      GI Cocktail, lipase, LR infusion, Omeprazole.      Juan Porras    "

## 2019-12-02 NOTE — ASSESSMENT & PLAN NOTE
-asymptomatic, monitor and trend daily, avoid obvious hepatotoxins  It most likely secondary to alcohol abuse.  RUQ US only with gallbladder sludge, otherwise unremarkable  -check HEP panel-negative  -LFTs continue to improve with ETOH cessation

## 2019-12-02 NOTE — ED PROVIDER NOTES
ED Provider Note    HPI: Patient is a poor historian.  I cannot determine whether this is primarily due to language issues hernandez simply did not wish to cooperate very much with history.  From what I can determine [place]  The patient was given aspirin and nitroglycerin but no narcotics by paramedics.  On arrival here the patient states his pain is now essentially gone.  Was requesting water.  He denied nausea but had some shortness of breath.  Is had no vomiting.  No fever no leg pain no leg swelling.  As best as I can determine the patient has no other somatic complaints.  The patient appeared to be fairly intoxicated.    Review of Systems: Patient declined to cooperate with review of systems.    Past medical/surgical history: Diabetes    Medications: Patient cannot tell me if he is taking any medications.    Allergies: None    Social History: Positive for alcohol use denies drug use      Physical exam: Constitutional: Somewhat disheveled male sleepy but arousable  Vital signs: Temperature 98.6 pulse 10 2 repeat 97 respirations 16 blood pressure 136/78 pulse oximetry 92%  EYES: PERRL, EOMI, Conjunctivae and sclera normal, eyelids normal bilaterally.  Neck: Trachea midline. No cervical masses seen or palpated. Normal range of motion, supple. No meningeal signs elicited.  Cardiac: Regular rate and rhythm. S1-S2 present. No S3 or S4 present. No murmurs, rubs, or gallops heard. No edema or varicosities were seen.   Lungs: Clear to auscultation with good aeration throughout. No wheezes, rales, or rhonchi heard. Patient's chest wall moved symmetrically with each respiratory effort. Patient was not making use of accessory muscles of respiration in breathing.  Abdomen: Soft nontender to palpation. No rebound or guarding elicited. No organomegaly identified. No pulsatile abdominal masses identified.   Musculoskeletal:  no  pain with palpitation or movement of  muscle, bone or joint , no obvious musculoskeletal deformities identified.  Neurologic: alert and awake answers questions appropriately. Moves all four extremities independently, no gross focal abnormalities identified. Normal strength and motor.  Speech somewhat slurred.  Skin: no rash or lesion seen, no palpable dermatologic lesions identified.  Psychiatric: not anxious, delusional, or hallucinating.    Medical decision making: EKG obtained on arrival (interpretation) twelve-lead EKG sinus tachycardia rate 113.  Morphology P waves QRS complexes T waves unremarkable.  No evidence of ST elevation or depression.  R wave progression normal.  Interpreted as an abnormal EKG due to the rate but otherwise unremarkable not showing any evidence of ischemia or dysrhythmia    Laboratory studies obtained (please see lab sheet for all results) significant findings included unremarkable CBC.  Patient is moderately hyperglycemic blood sugar 293 with pseudohyponatremia 131.  Hepatic dysfunction is present with an SGOT of 289 SGPT 159 alkaline phosphatase of 115.  I suspect this is due to alcohol use.  Blood alcohol 0.29.  Troponin normal at 10    Chest x-ray obtained; no acute abnormalities were seen.    Patient be admitted for observation.  While it is encouraging his initial troponin is unremarkable and his EKG does not show much no acute ischemic changes he is diabetic and complained of chest pain associated with shortness of breath.  His signs and symptoms would not seem to go along with either aortic disease or pulmonary embolism.  I would characterize his pain is having at least a moderate likelihood of cardiac etiology.  He will be admitted for serial troponins provocative stress testing and managed for possible alcohol withdrawal.  Care of patient endorsed over to hospitalist service at 5:45 PM    Impression 1) chest pain moderate likelihood of cardiac etiology  2) acute alcohol intoxication  3) hyperglycemia

## 2019-12-02 NOTE — H&P
Hospital Medicine History & Physical Note    Date of Service  12/1/2019    Primary Care Physician  Pcp Pt States None    Consultants  None    Code Status  Full code    Chief Complaint  Chest pain started today    History of Presenting Illness    54 y.o. male with past medical history of diabetes and hypertension who presented to the hospital 12/1/2019 with complaint of left-sided chest pain.  He reported that he was in [facility] and has been drinking alcohol and he noticed severe chest pain that was associated with shortness of breath and alleviated with medication he received no specific aggravating factor.  He denies any prior history of similar type of chest pain and it was associated with shortness of breath.  He reported that he recently moved from [place] 3 months ago and 2 days ago he lost his job.  He has been drinking excessive amount of alcohol and he was unable to provide how much alcohol.  At the time of evaluation he expressed that his chest pain is subsided and he does not have any shortness of breath anymore.  He was very lethargic and was unable to provide complete detailed information.    He is a poor historian as he was unable to provide detailed information.    Review of Systems  Review of Systems   Constitutional: Negative for chills, fever and weight loss.   HENT: Negative for hearing loss and tinnitus.    Eyes: Negative for blurred vision, double vision, photophobia and pain.   Respiratory: Positive for shortness of breath. Negative for cough and sputum production.    Cardiovascular: Positive for chest pain. Negative for palpitations, orthopnea and leg swelling.   Gastrointestinal: Negative for abdominal pain, constipation, diarrhea, nausea and vomiting.   Genitourinary: Negative for dysuria, frequency and urgency.   Musculoskeletal: Negative for back pain, joint pain, myalgias and neck pain.   Skin: Negative for rash.   Neurological: Negative for dizziness, tingling, tremors, sensory change,  speech change, focal weakness and headaches.   Psychiatric/Behavioral: Positive for substance abuse. Negative for hallucinations. The patient is nervous/anxious.    All other systems reviewed and are negative.      Past Medical History   has a past medical history of Diabetes (HCC).    Surgical History  I reviewed with patient he denies any history past surgery    Family History  Reviewed with patient he expressed that he has a family history of diabetes in both side of the family.    Social History   reports current alcohol use. He reports previous drug use.    Allergies  No Known Allergies    Medications  Prior to Admission Medications   Prescriptions Last Dose Informant Patient Reported? Taking?   losartan (COZAAR) 50 MG Tab 11/29/2019 at Unknown time  Yes Yes   Sig: Take 50 mg by mouth every day.   metformin (GLUCOPHAGE) 1000 MG tablet 11/29/2019 at Unknown time  Yes Yes   Sig: Take 1,000 mg by mouth 2 times a day, with meals.   multivitamin (THERAGRAN) Tab 11/29/2019 at Unknown time  Yes Yes   Sig: Take 1 Tab by mouth every day.      Facility-Administered Medications: None       Physical Exam  Temp:  [37 °C (98.6 °F)] 37 °C (98.6 °F)  Pulse:  [] 97  Resp:  [16] 16  BP: (136-141)/(78) 136/78  SpO2:  [92 %] 92 %    Physical Exam  Vitals signs reviewed.   Constitutional:       General: He is not in acute distress.     Comments: Somnolent   HENT:      Head: Normocephalic and atraumatic. No contusion.      Right Ear: External ear normal.      Left Ear: External ear normal.      Nose: Nose normal.      Mouth/Throat:      Mouth: Mucous membranes are dry.      Pharynx: No oropharyngeal exudate.   Eyes:      General:         Right eye: No discharge.         Left eye: No discharge.      Pupils: Pupils are equal, round, and reactive to light.   Neck:      Musculoskeletal: No neck rigidity or muscular tenderness.   Cardiovascular:      Rate and Rhythm: Normal rate and regular rhythm.      Heart sounds: No murmur. No  friction rub. No gallop.    Pulmonary:      Effort: Pulmonary effort is normal.      Breath sounds: No wheezing or rhonchi.   Abdominal:      General: Bowel sounds are normal. There is no distension.      Palpations: Abdomen is soft.      Tenderness: There is no tenderness. There is no rebound.   Musculoskeletal: Normal range of motion.         General: No swelling or tenderness.   Skin:     General: Skin is warm and dry.      Coloration: Skin is not jaundiced.   Neurological:      General: No focal deficit present.      Mental Status: He is alert.      Cranial Nerves: No cranial nerve deficit.      Sensory: No sensory deficit.   Psychiatric:         Mood and Affect: Mood normal.         Laboratory:  Recent Labs     12/01/19  1547   WBC 5.7   RBC 4.01*   HEMOGLOBIN 13.5*   HEMATOCRIT 37.9*   MCV 94.5   MCH 33.7*   MCHC 35.6*   RDW 38.3   PLATELETCT 129*   MPV 9.4     Recent Labs     12/01/19  1547   SODIUM 131*   POTASSIUM 3.4*   CHLORIDE 92*   CO2 21   GLUCOSE 293*   BUN 10   CREATININE 0.92   CALCIUM 8.6     Recent Labs     12/01/19  1547   ALTSGPT 159*   ASTSGOT 289*   ALKPHOSPHAT 115*   TBILIRUBIN 1.0   GLUCOSE 293*         No results for input(s): NTPROBNP in the last 72 hours.      Recent Labs     12/01/19  1547   TROPONINT 10       Urinalysis:    No results found     Imaging:  US-RUQ   Final Result      1.  Sludge within the gallbladder. No gallstones or biliary ductal dilatation.      2.  Fatty change of the liver.      3.  Small right upper pole renal cyst.      DX-CHEST-PORTABLE (1 VIEW)   Final Result      No evidence of acute cardiopulmonary process.      NM-CARDIAC STRESS TEST    (Results Pending)   EC-ECHOCARDIOGRAM COMPLETE W/O CONT    (Results Pending)         Assessment/Plan:  I anticipate this patient is appropriate for observation status at this time.    Chest pain  Assessment & Plan  He presented with symptoms of chest pain that lasted for 20 minutes and it went away.  Symptoms of shortness of  breath has also resolved.  Initial troponin is negative and EKG did not show ST elevation.  Admit to telemetry for close monitoring.  Serial troponin and EKG.  Order echocardiogram.  I ordered d-dimer.  I ordered nuclear medicine stress testing.      Elevated liver enzymes  Assessment & Plan  To have elevated liver enzymes.  He does not have any right upper quadrant tenderness.  It most likely secondary to alcohol abuse.  I ordered ultrasound right upper quadrant.  I ordered acute hepatitis panel.    Alcohol abuse  Assessment & Plan  He has been drinking excessive amount of alcohol.  He was somnolent unable to provide how much alcohol he has been drinking.  His blood alcohol level was elevated on presentation.  Started him on CIWA protocol.  I ordered IV banana bag.  Admit to telemetry for close monitoring.  I discussed plan of care with him.      Chest x-ray on my interpretation did not show any acute cardiopulmonary process.    VTE prophylaxis: Heparin

## 2019-12-02 NOTE — PROGRESS NOTES
Telemetry Summary:    Measurements: .16/.08/.32     Rhythm: ST    Ectopy: PVC    Rate: 102-121 (Tachy 158 with ambulation)     Juan Porras

## 2019-12-02 NOTE — PROGRESS NOTES
Assumed care of patient at bedside report from NOC RN. Updated on POC. Patient currently A & O x 4; on RA; up with one assist; without complaints of acute pain. Call light within reach. Whiteboard updated. Fall precautions in place. Bed locked and in lowest position. All questions answered. No other needs indicated at this time.

## 2019-12-02 NOTE — PROGRESS NOTES
2 RN Skin Check    2 RN skin check complete.   Devices in place: NONE    Confirmed pressure ulcers found on: N/A.  New potential pressure ulcers noted on NO  Wound consult placed N/A.  The following interventions in place Pillows.    2-RN skin assessment completed this shift with JENNI Stevens.  Skin was assessed for abnormalities head to toe and noted in detail skin note.      FACE/NECK: Intact    BACK of HEAD: Intact    EARS: Intact    CHEST: Intact    ABDOMEN: Intact-Distended    BACK: Intact    BUTTOCKS/SACRUM: Intact-Blanching    GROIN: Deferred    ARMS: Intact    LEGS: BL LE Abraisions    FEET: Dry, Flaky, Intact    This completes the 2-RN skin assessment.      Juan Porras

## 2019-12-02 NOTE — CARE PLAN
Problem: Communication  Goal: The ability to communicate needs accurately and effectively will improve  Outcome: PROGRESSING AS EXPECTED  Note:   Discussed POC with patient, patient agrees to participate in POC.  Patient encouraged to use call bell to ring for assistance.  Patient oriented to room, call bell, and bed controls.  Open line of communication established with the patient.         Problem: Safety  Goal: Will remain free from injury  Outcome: PROGRESSING AS EXPECTED  Note:   Instructed patient to use call bell and ring for assistance prior to ambulation.  Non-Skid foot ware in place, bed in low locked position, call bell and personal belongings within reach.         Problem: Infection  Goal: Will remain free from infection  Outcome: PROGRESSING AS EXPECTED  Note:   No Signs of infection      Problem: Bowel/Gastric:  Goal: Normal bowel function is maintained or improved  Outcome: PROGRESSING AS EXPECTED  Note:   ABD Distended, Rounded      Problem: Knowledge Deficit  Goal: Knowledge of disease process/condition, treatment plan, diagnostic tests, and medications will improve  Outcome: PROGRESSING AS EXPECTED  Note:   Discussed plan of care and medications with patient.  Patient verbalizes understandings of treatment regimen.      Patient needs reinforcement, difficult to education.

## 2019-12-02 NOTE — ASSESSMENT & PLAN NOTE
-Normal ECHO and NM cardiac stress test  -likely MSK in nature or ETOH gastritis/acid reflux related

## 2019-12-02 NOTE — PROGRESS NOTES
Received pt report from ED RNNoam.    Pt awake, alert, oriented X 4.  Pt resting in bed. Pt up to the bathroom, SB assist.  Bed in low locked position, call bell at the bedside, tray table & personal belongings within reach. Non-skid footwear intact. White board updated to reflect plan of care for current shift. Pt door tags reviewed. Bed Alarm ON.    Assessed patient’s Neuro Status, Comfort Level, Admission Screen, 2RN Skin Note.    Vitals WNL-Hypertensive.    Tele NSR/ST. Juan Porras

## 2019-12-03 ENCOUNTER — APPOINTMENT (OUTPATIENT)
Dept: RADIOLOGY | Facility: MEDICAL CENTER | Age: 54
DRG: 897 | End: 2019-12-03
Attending: INTERNAL MEDICINE

## 2019-12-03 PROBLEM — E11.9 TYPE 2 DIABETES MELLITUS WITHOUT COMPLICATION, WITHOUT LONG-TERM CURRENT USE OF INSULIN (HCC): Status: ACTIVE | Noted: 2019-12-02

## 2019-12-03 LAB
ALBUMIN SERPL BCP-MCNC: 3.1 G/DL (ref 3.2–4.9)
ALBUMIN/GLOB SERPL: 1.1 G/DL
ALP SERPL-CCNC: 100 U/L (ref 30–99)
ALT SERPL-CCNC: 140 U/L (ref 2–50)
ANION GAP SERPL CALC-SCNC: 12 MMOL/L (ref 0–11.9)
AST SERPL-CCNC: 207 U/L (ref 12–45)
BASOPHILS # BLD AUTO: 1 % (ref 0–1.8)
BASOPHILS # BLD: 0.05 K/UL (ref 0–0.12)
BILIRUB SERPL-MCNC: 1.7 MG/DL (ref 0.1–1.5)
BUN SERPL-MCNC: 9 MG/DL (ref 8–22)
CALCIUM SERPL-MCNC: 8.1 MG/DL (ref 8.5–10.5)
CHLORIDE SERPL-SCNC: 94 MMOL/L (ref 96–112)
CO2 SERPL-SCNC: 25 MMOL/L (ref 20–33)
CREAT SERPL-MCNC: 0.76 MG/DL (ref 0.5–1.4)
EOSINOPHIL # BLD AUTO: 0.29 K/UL (ref 0–0.51)
EOSINOPHIL NFR BLD: 5.6 % (ref 0–6.9)
ERYTHROCYTE [DISTWIDTH] IN BLOOD BY AUTOMATED COUNT: 37 FL (ref 35.9–50)
GLOBULIN SER CALC-MCNC: 2.8 G/DL (ref 1.9–3.5)
GLUCOSE BLD-MCNC: 194 MG/DL (ref 65–99)
GLUCOSE BLD-MCNC: 197 MG/DL (ref 65–99)
GLUCOSE BLD-MCNC: 245 MG/DL (ref 65–99)
GLUCOSE BLD-MCNC: 297 MG/DL (ref 65–99)
GLUCOSE SERPL-MCNC: 163 MG/DL (ref 65–99)
HCT VFR BLD AUTO: 39.1 % (ref 42–52)
HGB BLD-MCNC: 14 G/DL (ref 14–18)
IMM GRANULOCYTES # BLD AUTO: 0.01 K/UL (ref 0–0.11)
IMM GRANULOCYTES NFR BLD AUTO: 0.2 % (ref 0–0.9)
LYMPHOCYTES # BLD AUTO: 1.33 K/UL (ref 1–4.8)
LYMPHOCYTES NFR BLD: 25.7 % (ref 22–41)
MAGNESIUM SERPL-MCNC: 1.3 MG/DL (ref 1.5–2.5)
MCH RBC QN AUTO: 33.5 PG (ref 27–33)
MCHC RBC AUTO-ENTMCNC: 35.8 G/DL (ref 33.7–35.3)
MCV RBC AUTO: 93.5 FL (ref 81.4–97.8)
MONOCYTES # BLD AUTO: 0.61 K/UL (ref 0–0.85)
MONOCYTES NFR BLD AUTO: 11.8 % (ref 0–13.4)
NEUTROPHILS # BLD AUTO: 2.89 K/UL (ref 1.82–7.42)
NEUTROPHILS NFR BLD: 55.7 % (ref 44–72)
NRBC # BLD AUTO: 0 K/UL
NRBC BLD-RTO: 0 /100 WBC
PHOSPHATE SERPL-MCNC: 3.3 MG/DL (ref 2.5–4.5)
PLATELET # BLD AUTO: 110 K/UL (ref 164–446)
PMV BLD AUTO: 9.3 FL (ref 9–12.9)
POTASSIUM SERPL-SCNC: 3.1 MMOL/L (ref 3.6–5.5)
PROT SERPL-MCNC: 5.9 G/DL (ref 6–8.2)
RBC # BLD AUTO: 4.18 M/UL (ref 4.7–6.1)
SODIUM SERPL-SCNC: 131 MMOL/L (ref 135–145)
WBC # BLD AUTO: 5.2 K/UL (ref 4.8–10.8)

## 2019-12-03 PROCEDURE — 700102 HCHG RX REV CODE 250 W/ 637 OVERRIDE(OP): Performed by: INTERNAL MEDICINE

## 2019-12-03 PROCEDURE — 96372 THER/PROPH/DIAG INJ SC/IM: CPT

## 2019-12-03 PROCEDURE — 80053 COMPREHEN METABOLIC PANEL: CPT

## 2019-12-03 PROCEDURE — 700111 HCHG RX REV CODE 636 W/ 250 OVERRIDE (IP): Performed by: INTERNAL MEDICINE

## 2019-12-03 PROCEDURE — 85025 COMPLETE CBC W/AUTO DIFF WBC: CPT

## 2019-12-03 PROCEDURE — 84100 ASSAY OF PHOSPHORUS: CPT

## 2019-12-03 PROCEDURE — 83735 ASSAY OF MAGNESIUM: CPT

## 2019-12-03 PROCEDURE — 770020 HCHG ROOM/CARE - TELE (206)

## 2019-12-03 PROCEDURE — A9270 NON-COVERED ITEM OR SERVICE: HCPCS | Performed by: INTERNAL MEDICINE

## 2019-12-03 PROCEDURE — 700105 HCHG RX REV CODE 258: Performed by: INTERNAL MEDICINE

## 2019-12-03 PROCEDURE — 36415 COLL VENOUS BLD VENIPUNCTURE: CPT

## 2019-12-03 PROCEDURE — 82962 GLUCOSE BLOOD TEST: CPT | Mod: 91

## 2019-12-03 PROCEDURE — 99233 SBSQ HOSP IP/OBS HIGH 50: CPT | Performed by: INTERNAL MEDICINE

## 2019-12-03 RX ORDER — POTASSIUM CHLORIDE 20 MEQ/1
30 TABLET, EXTENDED RELEASE ORAL 3 TIMES DAILY
Status: DISPENSED | OUTPATIENT
Start: 2019-12-03 | End: 2019-12-05

## 2019-12-03 RX ORDER — MAGNESIUM SULFATE HEPTAHYDRATE 40 MG/ML
4 INJECTION, SOLUTION INTRAVENOUS ONCE
Status: COMPLETED | OUTPATIENT
Start: 2019-12-03 | End: 2019-12-03

## 2019-12-03 RX ADMIN — ASPIRIN 325 MG: 325 TABLET, FILM COATED ORAL at 04:38

## 2019-12-03 RX ADMIN — INSULIN HUMAN 3 UNITS: 100 INJECTION, SOLUTION PARENTERAL at 17:53

## 2019-12-03 RX ADMIN — LOSARTAN POTASSIUM 100 MG: 50 TABLET, FILM COATED ORAL at 04:38

## 2019-12-03 RX ADMIN — HEPARIN SODIUM 5000 UNITS: 5000 INJECTION, SOLUTION INTRAVENOUS; SUBCUTANEOUS at 20:00

## 2019-12-03 RX ADMIN — LIDOCAINE HYDROCHLORIDE 30 ML: 20 SOLUTION OROPHARYNGEAL at 19:53

## 2019-12-03 RX ADMIN — THERA TABS 1 TABLET: TAB at 04:38

## 2019-12-03 RX ADMIN — INSULIN HUMAN 1 UNITS: 100 INJECTION, SOLUTION PARENTERAL at 06:11

## 2019-12-03 RX ADMIN — INSULIN HUMAN 2 UNITS: 100 INJECTION, SOLUTION PARENTERAL at 20:00

## 2019-12-03 RX ADMIN — Medication 100 MG: at 04:38

## 2019-12-03 RX ADMIN — HEPARIN SODIUM 5000 UNITS: 5000 INJECTION, SOLUTION INTRAVENOUS; SUBCUTANEOUS at 04:38

## 2019-12-03 RX ADMIN — SODIUM CHLORIDE, POTASSIUM CHLORIDE, SODIUM LACTATE AND CALCIUM CHLORIDE: 600; 310; 30; 20 INJECTION, SOLUTION INTRAVENOUS at 23:44

## 2019-12-03 RX ADMIN — POTASSIUM CHLORIDE 30 MEQ: 1500 TABLET, EXTENDED RELEASE ORAL at 12:34

## 2019-12-03 RX ADMIN — DIBASIC SODIUM PHOSPHATE, MONOBASIC POTASSIUM PHOSPHATE AND MONOBASIC SODIUM PHOSPHATE 2 TABLET: 852; 155; 130 TABLET ORAL at 00:14

## 2019-12-03 RX ADMIN — DIBASIC SODIUM PHOSPHATE, MONOBASIC POTASSIUM PHOSPHATE AND MONOBASIC SODIUM PHOSPHATE 2 TABLET: 852; 155; 130 TABLET ORAL at 04:38

## 2019-12-03 RX ADMIN — SODIUM CHLORIDE, POTASSIUM CHLORIDE, SODIUM LACTATE AND CALCIUM CHLORIDE: 600; 310; 30; 20 INJECTION, SOLUTION INTRAVENOUS at 04:44

## 2019-12-03 RX ADMIN — MAGNESIUM SULFATE IN WATER 4 G: 40 INJECTION, SOLUTION INTRAVENOUS at 12:33

## 2019-12-03 RX ADMIN — DIBASIC SODIUM PHOSPHATE, MONOBASIC POTASSIUM PHOSPHATE AND MONOBASIC SODIUM PHOSPHATE 2 TABLET: 852; 155; 130 TABLET ORAL at 12:33

## 2019-12-03 RX ADMIN — POTASSIUM CHLORIDE 30 MEQ: 1500 TABLET, EXTENDED RELEASE ORAL at 17:54

## 2019-12-03 RX ADMIN — FOLIC ACID 1 MG: 1 TABLET ORAL at 04:38

## 2019-12-03 RX ADMIN — DIBASIC SODIUM PHOSPHATE, MONOBASIC POTASSIUM PHOSPHATE AND MONOBASIC SODIUM PHOSPHATE 2 TABLET: 852; 155; 130 TABLET ORAL at 17:54

## 2019-12-03 RX ADMIN — OMEPRAZOLE 20 MG: 20 CAPSULE, DELAYED RELEASE ORAL at 04:38

## 2019-12-03 ASSESSMENT — LIFESTYLE VARIABLES
AGITATION: NORMAL ACTIVITY
ANXIETY: MILDLY ANXIOUS
TREMOR: NO TREMOR
PAROXYSMAL SWEATS: BARELY PERCEPTIBLE SWEATING. PALMS MOIST
PAROXYSMAL SWEATS: BARELY PERCEPTIBLE SWEATING. PALMS MOIST
TOTAL SCORE: 1
PAROXYSMAL SWEATS: BARELY PERCEPTIBLE SWEATING. PALMS MOIST
ORIENTATION AND CLOUDING OF SENSORIUM: ORIENTED AND CAN DO SERIAL ADDITIONS
HEADACHE, FULLNESS IN HEAD: NOT PRESENT
TREMOR: NO TREMOR
VISUAL DISTURBANCES: NOT PRESENT
AUDITORY DISTURBANCES: NOT PRESENT
VISUAL DISTURBANCES: NOT PRESENT
TREMOR: NO TREMOR
TOTAL SCORE: 2
AGITATION: NORMAL ACTIVITY
NAUSEA AND VOMITING: MILD NAUSEA WITH NO VOMITING
ANXIETY: MILDLY ANXIOUS
HEADACHE, FULLNESS IN HEAD: NOT PRESENT
VISUAL DISTURBANCES: NOT PRESENT
TOTAL SCORE: 3
ORIENTATION AND CLOUDING OF SENSORIUM: ORIENTED AND CAN DO SERIAL ADDITIONS
HEADACHE, FULLNESS IN HEAD: NOT PRESENT
TOTAL SCORE: 4
TOTAL SCORE: 2
PAROXYSMAL SWEATS: BARELY PERCEPTIBLE SWEATING. PALMS MOIST
ORIENTATION AND CLOUDING OF SENSORIUM: ORIENTED AND CAN DO SERIAL ADDITIONS
ANXIETY: MILDLY ANXIOUS
AUDITORY DISTURBANCES: NOT PRESENT
ORIENTATION AND CLOUDING OF SENSORIUM: ORIENTED AND CAN DO SERIAL ADDITIONS
AGITATION: NORMAL ACTIVITY
HEADACHE, FULLNESS IN HEAD: NOT PRESENT
NAUSEA AND VOMITING: MILD NAUSEA WITH NO VOMITING
PAROXYSMAL SWEATS: NO SWEAT VISIBLE
AGITATION: NORMAL ACTIVITY
AGITATION: NORMAL ACTIVITY
AUDITORY DISTURBANCES: NOT PRESENT
HEADACHE, FULLNESS IN HEAD: NOT PRESENT
AUDITORY DISTURBANCES: NOT PRESENT
ANXIETY: MILDLY ANXIOUS
NAUSEA AND VOMITING: NO NAUSEA AND NO VOMITING
TREMOR: TREMOR NOT VISIBLE BUT CAN BE FELT, FINGERTIP TO FINGERTIP
VISUAL DISTURBANCES: NOT PRESENT
TREMOR: TREMOR NOT VISIBLE BUT CAN BE FELT, FINGERTIP TO FINGERTIP
VISUAL DISTURBANCES: NOT PRESENT
PAROXYSMAL SWEATS: BARELY PERCEPTIBLE SWEATING. PALMS MOIST
VISUAL DISTURBANCES: NOT PRESENT
ANXIETY: MILDLY ANXIOUS
HEADACHE, FULLNESS IN HEAD: NOT PRESENT
AUDITORY DISTURBANCES: NOT PRESENT
AUDITORY DISTURBANCES: NOT PRESENT
ANXIETY: NO ANXIETY (AT EASE)
NAUSEA AND VOMITING: NO NAUSEA AND NO VOMITING
AGITATION: NORMAL ACTIVITY
NAUSEA AND VOMITING: MILD NAUSEA WITH NO VOMITING
ORIENTATION AND CLOUDING OF SENSORIUM: ORIENTED AND CAN DO SERIAL ADDITIONS
NAUSEA AND VOMITING: NO NAUSEA AND NO VOMITING
TREMOR: NO TREMOR
TOTAL SCORE: 3
ORIENTATION AND CLOUDING OF SENSORIUM: ORIENTED AND CAN DO SERIAL ADDITIONS

## 2019-12-03 ASSESSMENT — ENCOUNTER SYMPTOMS
VOMITING: 0
FEVER: 0
SHORTNESS OF BREATH: 0
DEPRESSION: 1
PALPITATIONS: 0
COUGH: 0
NAUSEA: 0
ABDOMINAL PAIN: 0
WEAKNESS: 1
TREMORS: 0
TINGLING: 0

## 2019-12-03 NOTE — CARE PLAN
Problem: Communication  Goal: The ability to communicate needs accurately and effectively will improve  Outcome: PROGRESSING AS EXPECTED  Note:   Patient oriented to room and surroundings. Patient educated to communicate needs accurately and effectively. Patient encouraged to vocalize questions and concerns regarding POC. No concerns or questions noted at this time.        Problem: Safety  Goal: Will remain free from falls  Outcome: PROGRESSING AS EXPECTED  Note:   Patient will remain free from falls. Patient educated on importance of calling for assistance when assistance needed. Patient educated on tread socks, belongings within reach, and use of call light. Fall risk wristband on, bed alarm on. Bed in low and locked position. Fall precautions in place.

## 2019-12-03 NOTE — PROGRESS NOTES
Report received. Patient A&O x 4. Reports no pain.  VS Stable. POC discussed, patient verbalized understanding. Belongings and bedside table within reach. Bed in low and locked positions. Fall precautions in place. Patient educated to call for assistance. Hourly rounding in place. No other needs at this time.

## 2019-12-04 LAB
ALBUMIN SERPL BCP-MCNC: 3 G/DL (ref 3.2–4.9)
ALBUMIN/GLOB SERPL: 1 G/DL
ALP SERPL-CCNC: 100 U/L (ref 30–99)
ALT SERPL-CCNC: 117 U/L (ref 2–50)
ANION GAP SERPL CALC-SCNC: 11 MMOL/L (ref 0–11.9)
AST SERPL-CCNC: 139 U/L (ref 12–45)
BILIRUB SERPL-MCNC: 1.6 MG/DL (ref 0.1–1.5)
BUN SERPL-MCNC: 11 MG/DL (ref 8–22)
CALCIUM SERPL-MCNC: 8.4 MG/DL (ref 8.5–10.5)
CHLORIDE SERPL-SCNC: 95 MMOL/L (ref 96–112)
CO2 SERPL-SCNC: 23 MMOL/L (ref 20–33)
CREAT SERPL-MCNC: 0.79 MG/DL (ref 0.5–1.4)
GLOBULIN SER CALC-MCNC: 3.1 G/DL (ref 1.9–3.5)
GLUCOSE BLD-MCNC: 212 MG/DL (ref 65–99)
GLUCOSE BLD-MCNC: 227 MG/DL (ref 65–99)
GLUCOSE BLD-MCNC: 238 MG/DL (ref 65–99)
GLUCOSE BLD-MCNC: 334 MG/DL (ref 65–99)
GLUCOSE SERPL-MCNC: 189 MG/DL (ref 65–99)
MAGNESIUM SERPL-MCNC: 1.9 MG/DL (ref 1.5–2.5)
PHOSPHATE SERPL-MCNC: 3.6 MG/DL (ref 2.5–4.5)
POTASSIUM SERPL-SCNC: 3.7 MMOL/L (ref 3.6–5.5)
PROT SERPL-MCNC: 6.1 G/DL (ref 6–8.2)
SODIUM SERPL-SCNC: 129 MMOL/L (ref 135–145)

## 2019-12-04 PROCEDURE — 700102 HCHG RX REV CODE 250 W/ 637 OVERRIDE(OP): Performed by: INTERNAL MEDICINE

## 2019-12-04 PROCEDURE — 36415 COLL VENOUS BLD VENIPUNCTURE: CPT

## 2019-12-04 PROCEDURE — A9270 NON-COVERED ITEM OR SERVICE: HCPCS | Performed by: INTERNAL MEDICINE

## 2019-12-04 PROCEDURE — 99232 SBSQ HOSP IP/OBS MODERATE 35: CPT | Performed by: INTERNAL MEDICINE

## 2019-12-04 PROCEDURE — 80053 COMPREHEN METABOLIC PANEL: CPT

## 2019-12-04 PROCEDURE — 82962 GLUCOSE BLOOD TEST: CPT

## 2019-12-04 PROCEDURE — 84100 ASSAY OF PHOSPHORUS: CPT

## 2019-12-04 PROCEDURE — 700111 HCHG RX REV CODE 636 W/ 250 OVERRIDE (IP): Performed by: INTERNAL MEDICINE

## 2019-12-04 PROCEDURE — 700105 HCHG RX REV CODE 258: Performed by: INTERNAL MEDICINE

## 2019-12-04 PROCEDURE — 770020 HCHG ROOM/CARE - TELE (206)

## 2019-12-04 PROCEDURE — 83735 ASSAY OF MAGNESIUM: CPT

## 2019-12-04 RX ORDER — INSULIN GLARGINE 100 [IU]/ML
5 INJECTION, SOLUTION SUBCUTANEOUS 2 TIMES DAILY
Status: DISCONTINUED | OUTPATIENT
Start: 2019-12-04 | End: 2019-12-06 | Stop reason: HOSPADM

## 2019-12-04 RX ORDER — AMLODIPINE BESYLATE 10 MG/1
10 TABLET ORAL
Status: DISCONTINUED | OUTPATIENT
Start: 2019-12-04 | End: 2019-12-06 | Stop reason: HOSPADM

## 2019-12-04 RX ADMIN — POTASSIUM CHLORIDE 30 MEQ: 1500 TABLET, EXTENDED RELEASE ORAL at 16:43

## 2019-12-04 RX ADMIN — INSULIN HUMAN 2 UNITS: 100 INJECTION, SOLUTION PARENTERAL at 20:43

## 2019-12-04 RX ADMIN — INSULIN HUMAN 2 UNITS: 100 INJECTION, SOLUTION PARENTERAL at 16:47

## 2019-12-04 RX ADMIN — SODIUM CHLORIDE, POTASSIUM CHLORIDE, SODIUM LACTATE AND CALCIUM CHLORIDE: 600; 310; 30; 20 INJECTION, SOLUTION INTRAVENOUS at 13:35

## 2019-12-04 RX ADMIN — THERA TABS 1 TABLET: TAB at 04:39

## 2019-12-04 RX ADMIN — INSULIN HUMAN 2 UNITS: 100 INJECTION, SOLUTION PARENTERAL at 05:21

## 2019-12-04 RX ADMIN — INSULIN GLARGINE 5 UNITS: 100 INJECTION, SOLUTION SUBCUTANEOUS at 20:42

## 2019-12-04 RX ADMIN — HEPARIN SODIUM 5000 UNITS: 5000 INJECTION, SOLUTION INTRAVENOUS; SUBCUTANEOUS at 14:54

## 2019-12-04 RX ADMIN — DIBASIC SODIUM PHOSPHATE, MONOBASIC POTASSIUM PHOSPHATE AND MONOBASIC SODIUM PHOSPHATE 2 TABLET: 852; 155; 130 TABLET ORAL at 00:08

## 2019-12-04 RX ADMIN — FOLIC ACID 1 MG: 1 TABLET ORAL at 04:39

## 2019-12-04 RX ADMIN — POTASSIUM CHLORIDE 30 MEQ: 1500 TABLET, EXTENDED RELEASE ORAL at 04:39

## 2019-12-04 RX ADMIN — HEPARIN SODIUM 5000 UNITS: 5000 INJECTION, SOLUTION INTRAVENOUS; SUBCUTANEOUS at 20:42

## 2019-12-04 RX ADMIN — LOSARTAN POTASSIUM 100 MG: 50 TABLET, FILM COATED ORAL at 04:39

## 2019-12-04 RX ADMIN — HEPARIN SODIUM 5000 UNITS: 5000 INJECTION, SOLUTION INTRAVENOUS; SUBCUTANEOUS at 04:39

## 2019-12-04 RX ADMIN — OMEPRAZOLE 20 MG: 20 CAPSULE, DELAYED RELEASE ORAL at 04:39

## 2019-12-04 RX ADMIN — Medication 100 MG: at 04:39

## 2019-12-04 RX ADMIN — POTASSIUM CHLORIDE 30 MEQ: 1500 TABLET, EXTENDED RELEASE ORAL at 11:35

## 2019-12-04 RX ADMIN — INSULIN HUMAN 4 UNITS: 100 INJECTION, SOLUTION PARENTERAL at 11:39

## 2019-12-04 RX ADMIN — AMLODIPINE BESYLATE 10 MG: 10 TABLET ORAL at 10:06

## 2019-12-04 ASSESSMENT — ENCOUNTER SYMPTOMS
TINGLING: 0
TREMORS: 0
DEPRESSION: 1
COUGH: 0
SHORTNESS OF BREATH: 0
DIARRHEA: 0
WEAKNESS: 1

## 2019-12-04 ASSESSMENT — LIFESTYLE VARIABLES
TREMOR: NO TREMOR
ORIENTATION AND CLOUDING OF SENSORIUM: ORIENTED AND CAN DO SERIAL ADDITIONS
HEADACHE, FULLNESS IN HEAD: NOT PRESENT
AGITATION: NORMAL ACTIVITY
HEADACHE, FULLNESS IN HEAD: NOT PRESENT
VISUAL DISTURBANCES: NOT PRESENT
NAUSEA AND VOMITING: NO NAUSEA AND NO VOMITING
ANXIETY: NO ANXIETY (AT EASE)
TOTAL SCORE: 0
TREMOR: NO TREMOR
PAROXYSMAL SWEATS: NO SWEAT VISIBLE
AUDITORY DISTURBANCES: NOT PRESENT
ORIENTATION AND CLOUDING OF SENSORIUM: ORIENTED AND CAN DO SERIAL ADDITIONS
VISUAL DISTURBANCES: NOT PRESENT
PAROXYSMAL SWEATS: NO SWEAT VISIBLE
AUDITORY DISTURBANCES: NOT PRESENT
TOTAL SCORE: 0
AGITATION: NORMAL ACTIVITY
ANXIETY: NO ANXIETY (AT EASE)
NAUSEA AND VOMITING: NO NAUSEA AND NO VOMITING

## 2019-12-04 NOTE — CARE PLAN
Problem: Infection  Goal: Will remain free from infection  Outcome: PROGRESSING AS EXPECTED  Note:   Patient will remain free from infection to prevent further complications. Patient educated on the importance of routine hand hygiene to help prevent the spread of infection.       Problem: Knowledge Deficit  Goal: Knowledge of disease process/condition, treatment plan, diagnostic tests, and medications will improve  Outcome: PROGRESSING AS EXPECTED  Note:   Patient updated on POC, medication education provided. Patient encouraged to voice all questions and concerns.

## 2019-12-04 NOTE — PROGRESS NOTES
Pt stable this morning. No needs at this time. Dr currently in with pt now talking about plan. Will continue to monitor.

## 2019-12-04 NOTE — PROGRESS NOTES
Report received. Patient A&O x 4. Reports pain 2/10 in abdomen.  VS Stable. POC discussed, patient verbalized understanding. Belongings and bedside table within reach. Bed in low and locked positions. Fall precautions in place. Patient educated to call for assistance. Hourly rounding in place. No other needs at this time.

## 2019-12-04 NOTE — CARE PLAN
Problem: Safety  Goal: Will remain free from injury  Outcome: PROGRESSING AS EXPECTED   Bed locked and in lowest position. Call light within reach. Bed locked and in lowest position.

## 2019-12-04 NOTE — PROGRESS NOTES
Pt taken down to nuc med for stress test; once down there he refused test so pt brought back to room. Dr Schafer notified.

## 2019-12-04 NOTE — PROGRESS NOTES
Hospital Medicine Daily Progress Note    Date of Service  12/3/2019    Chief Complaint  54 y.o. male admitted 12/1/2019 with chest pain and ETOH WD    Hospital Course    see below      Interval Problem Update  CP-resolved. Patient refused NM cardiac stress test and he would not tell me why. No issues on tele last night.     ETOH WD-CIWA scores are 0-5. He feels he is still withdrawing and endorses being depressed. LFT's are improving.     Consultants/Specialty  none    Code Status  fcfc    Disposition  TELE    Review of Systems  Review of Systems   Constitutional: Positive for malaise/fatigue (not much improvement). Negative for fever.   Respiratory: Negative for cough and shortness of breath.    Cardiovascular: Negative for chest pain, palpitations and leg swelling.   Gastrointestinal: Negative for abdominal pain, nausea and vomiting.   Genitourinary: Negative for dysuria.   Neurological: Positive for weakness. Negative for tingling and tremors.   Psychiatric/Behavioral: Positive for depression.   All other systems reviewed and are negative.       Physical Exam  Temp:  [36.7 °C (98 °F)-37.6 °C (99.7 °F)] 37 °C (98.6 °F)  Pulse:  [] 87  Resp:  [16] 16  BP: (153-169)/() 159/87  SpO2:  [95 %-99 %] 99 %    Physical Exam  Vitals signs and nursing note reviewed.   Constitutional:       General: He is not in acute distress.     Appearance: He is well-developed.      Comments: Sleepy, but easily arousable   HENT:      Head: Normocephalic and atraumatic.      Nose: No congestion.      Mouth/Throat:      Pharynx: No oropharyngeal exudate.   Eyes:      General: No scleral icterus.     Pupils: Pupils are equal, round, and reactive to light.   Neck:      Musculoskeletal: Normal range of motion and neck supple.      Thyroid: No thyromegaly.   Cardiovascular:      Rate and Rhythm: Regular rhythm. Tachycardia present.      Heart sounds: Normal heart sounds. No murmur.   Pulmonary:      Effort: Pulmonary effort is  normal. No respiratory distress.      Breath sounds: Rales present.      Comments: Unchanged aeration today  Abdominal:      General: Bowel sounds are normal. There is no distension.      Palpations: Abdomen is soft.      Tenderness: There is no tenderness.   Musculoskeletal: Normal range of motion.         General: No tenderness.   Skin:     General: Skin is warm and dry.      Findings: No rash.   Neurological:      Mental Status: He is alert and oriented to person, place, and time.      Cranial Nerves: No cranial nerve deficit.         Fluids    Intake/Output Summary (Last 24 hours) at 12/3/2019 1712  Last data filed at 12/3/2019 0400  Gross per 24 hour   Intake 240 ml   Output 1100 ml   Net -860 ml       Laboratory  Recent Labs     12/01/19  1547 12/03/19  0318   WBC 5.7 5.2   RBC 4.01* 4.18*   HEMOGLOBIN 13.5* 14.0   HEMATOCRIT 37.9* 39.1*   MCV 94.5 93.5   MCH 33.7* 33.5*   MCHC 35.6* 35.8*   RDW 38.3 37.0   PLATELETCT 129* 110*   MPV 9.4 9.3     Recent Labs     12/01/19  1547 12/03/19  0318   SODIUM 131* 131*   POTASSIUM 3.4* 3.1*   CHLORIDE 92* 94*   CO2 21 25   GLUCOSE 293* 163*   BUN 10 9   CREATININE 0.92 0.76   CALCIUM 8.6 8.1*                   Imaging  EC-ECHOCARDIOGRAM COMPLETE W/O CONT   Final Result      US-RUQ   Final Result      1.  Sludge within the gallbladder. No gallstones or biliary ductal dilatation.      2.  Fatty change of the liver.      3.  Small right upper pole renal cyst.      DX-CHEST-PORTABLE (1 VIEW)   Final Result      No evidence of acute cardiopulmonary process.           Assessment/Plan  Chest pain- (present on admission)  Assessment & Plan  -remains without Chest pain again, etiology has a broad differential.   Serial troponin and EKG.  -ECHO is normal  -NM cardiac stress test has been refused by patient for unclear reasons  -no issues on tele, TROPS are ok    Type 2 diabetes mellitus without complication, without long-term current use of insulin (HCC)- (present on  admission)  Assessment & Plan  -A1c is 10.1 indicating new diagnosis, will order DM educator  -continue ISS, goal BS less than 180  -start lantus 5 units QHS  -change diet to diabetic    Elevated liver enzymes- (present on admission)  Assessment & Plan  -asymptomatic, monitor and trend daily, avoid obvious hepatotoxins  It most likely secondary to alcohol abuse.  RUQ US only with gallbladder sludge, otherwise unremarkable  -check HEP panel-negative  -LFTs are improving    Alcohol abuse- (present on admission)  Assessment & Plan  -continue CIWA, still actively withdrawing  -lytes are abnormal, replace K+ & PO4(-) aggressively-added back more supplementation  -monitor closely  -MVI/thiamine, folate         VTE prophylaxis: heparin

## 2019-12-05 ENCOUNTER — APPOINTMENT (OUTPATIENT)
Dept: RADIOLOGY | Facility: MEDICAL CENTER | Age: 54
DRG: 897 | End: 2019-12-05
Attending: INTERNAL MEDICINE

## 2019-12-05 ENCOUNTER — PATIENT OUTREACH (OUTPATIENT)
Dept: HEALTH INFORMATION MANAGEMENT | Facility: OTHER | Age: 54
End: 2019-12-05

## 2019-12-05 LAB
ALBUMIN SERPL BCP-MCNC: 3.3 G/DL (ref 3.2–4.9)
ALBUMIN/GLOB SERPL: 1.1 G/DL
ALP SERPL-CCNC: 136 U/L (ref 30–99)
ALT SERPL-CCNC: 126 U/L (ref 2–50)
ANION GAP SERPL CALC-SCNC: 8 MMOL/L (ref 0–11.9)
AST SERPL-CCNC: 138 U/L (ref 12–45)
BILIRUB SERPL-MCNC: 1.7 MG/DL (ref 0.1–1.5)
BUN SERPL-MCNC: 13 MG/DL (ref 8–22)
CALCIUM SERPL-MCNC: 8.8 MG/DL (ref 8.5–10.5)
CHLORIDE SERPL-SCNC: 98 MMOL/L (ref 96–112)
CO2 SERPL-SCNC: 23 MMOL/L (ref 20–33)
CREAT SERPL-MCNC: 0.86 MG/DL (ref 0.5–1.4)
GLOBULIN SER CALC-MCNC: 3 G/DL (ref 1.9–3.5)
GLUCOSE BLD-MCNC: 258 MG/DL (ref 65–99)
GLUCOSE BLD-MCNC: 268 MG/DL (ref 65–99)
GLUCOSE BLD-MCNC: 279 MG/DL (ref 65–99)
GLUCOSE BLD-MCNC: 291 MG/DL (ref 65–99)
GLUCOSE SERPL-MCNC: 207 MG/DL (ref 65–99)
POTASSIUM SERPL-SCNC: 4.3 MMOL/L (ref 3.6–5.5)
PROT SERPL-MCNC: 6.3 G/DL (ref 6–8.2)
SODIUM SERPL-SCNC: 129 MMOL/L (ref 135–145)
TROPONIN T SERPL-MCNC: 7 NG/L (ref 6–19)

## 2019-12-05 PROCEDURE — A9270 NON-COVERED ITEM OR SERVICE: HCPCS | Performed by: INTERNAL MEDICINE

## 2019-12-05 PROCEDURE — 700102 HCHG RX REV CODE 250 W/ 637 OVERRIDE(OP): Performed by: INTERNAL MEDICINE

## 2019-12-05 PROCEDURE — 84484 ASSAY OF TROPONIN QUANT: CPT

## 2019-12-05 PROCEDURE — 80053 COMPREHEN METABOLIC PANEL: CPT

## 2019-12-05 PROCEDURE — 36415 COLL VENOUS BLD VENIPUNCTURE: CPT

## 2019-12-05 PROCEDURE — 700105 HCHG RX REV CODE 258: Performed by: INTERNAL MEDICINE

## 2019-12-05 PROCEDURE — 700111 HCHG RX REV CODE 636 W/ 250 OVERRIDE (IP): Performed by: INTERNAL MEDICINE

## 2019-12-05 PROCEDURE — 99232 SBSQ HOSP IP/OBS MODERATE 35: CPT | Performed by: INTERNAL MEDICINE

## 2019-12-05 PROCEDURE — 700111 HCHG RX REV CODE 636 W/ 250 OVERRIDE (IP)

## 2019-12-05 PROCEDURE — A9502 TC99M TETROFOSMIN: HCPCS

## 2019-12-05 PROCEDURE — 770020 HCHG ROOM/CARE - TELE (206)

## 2019-12-05 PROCEDURE — 82962 GLUCOSE BLOOD TEST: CPT

## 2019-12-05 RX ORDER — REGADENOSON 0.08 MG/ML
INJECTION, SOLUTION INTRAVENOUS
Status: COMPLETED
Start: 2019-12-05 | End: 2019-12-05

## 2019-12-05 RX ORDER — REGADENOSON 0.08 MG/ML
0.4 INJECTION, SOLUTION INTRAVENOUS ONCE
Status: COMPLETED | OUTPATIENT
Start: 2019-12-05 | End: 2019-12-05

## 2019-12-05 RX ADMIN — LOSARTAN POTASSIUM 100 MG: 50 TABLET, FILM COATED ORAL at 05:02

## 2019-12-05 RX ADMIN — AMLODIPINE BESYLATE 10 MG: 10 TABLET ORAL at 05:02

## 2019-12-05 RX ADMIN — REGADENOSON 0.4 MG: 0.08 INJECTION, SOLUTION INTRAVENOUS at 10:10

## 2019-12-05 RX ADMIN — SODIUM CHLORIDE, POTASSIUM CHLORIDE, SODIUM LACTATE AND CALCIUM CHLORIDE: 600; 310; 30; 20 INJECTION, SOLUTION INTRAVENOUS at 03:58

## 2019-12-05 RX ADMIN — HEPARIN SODIUM 5000 UNITS: 5000 INJECTION, SOLUTION INTRAVENOUS; SUBCUTANEOUS at 05:02

## 2019-12-05 RX ADMIN — METFORMIN HYDROCHLORIDE 1000 MG: 500 TABLET ORAL at 17:05

## 2019-12-05 RX ADMIN — INSULIN GLARGINE 5 UNITS: 100 INJECTION, SOLUTION SUBCUTANEOUS at 17:08

## 2019-12-05 RX ADMIN — INSULIN HUMAN 3 UNITS: 100 INJECTION, SOLUTION PARENTERAL at 17:08

## 2019-12-05 RX ADMIN — Medication 100 MG: at 05:03

## 2019-12-05 RX ADMIN — INSULIN HUMAN 3 UNITS: 100 INJECTION, SOLUTION PARENTERAL at 05:09

## 2019-12-05 RX ADMIN — OMEPRAZOLE 20 MG: 20 CAPSULE, DELAYED RELEASE ORAL at 05:02

## 2019-12-05 RX ADMIN — INSULIN HUMAN 3 UNITS: 100 INJECTION, SOLUTION PARENTERAL at 20:37

## 2019-12-05 RX ADMIN — HEPARIN SODIUM 5000 UNITS: 5000 INJECTION, SOLUTION INTRAVENOUS; SUBCUTANEOUS at 20:37

## 2019-12-05 RX ADMIN — FOLIC ACID 1 MG: 1 TABLET ORAL at 05:02

## 2019-12-05 RX ADMIN — INSULIN HUMAN 3 UNITS: 100 INJECTION, SOLUTION PARENTERAL at 12:07

## 2019-12-05 RX ADMIN — HEPARIN SODIUM 5000 UNITS: 5000 INJECTION, SOLUTION INTRAVENOUS; SUBCUTANEOUS at 12:10

## 2019-12-05 RX ADMIN — THERA TABS 1 TABLET: TAB at 05:02

## 2019-12-05 RX ADMIN — INSULIN GLARGINE 5 UNITS: 100 INJECTION, SOLUTION SUBCUTANEOUS at 05:08

## 2019-12-05 SDOH — ECONOMIC STABILITY: FOOD INSECURITY: WITHIN THE PAST 12 MONTHS, YOU WORRIED THAT YOUR FOOD WOULD RUN OUT BEFORE YOU GOT MONEY TO BUY MORE.: SOMETIMES TRUE

## 2019-12-05 SDOH — ECONOMIC STABILITY: INCOME INSECURITY: HOW HARD IS IT FOR YOU TO PAY FOR THE VERY BASICS LIKE FOOD, HOUSING, MEDICAL CARE, AND HEATING?: SOMEWHAT HARD

## 2019-12-05 SDOH — ECONOMIC STABILITY: FOOD INSECURITY: WITHIN THE PAST 12 MONTHS, THE FOOD YOU BOUGHT JUST DIDN'T LAST AND YOU DIDN'T HAVE MONEY TO GET MORE.: SOMETIMES TRUE

## 2019-12-05 SDOH — ECONOMIC STABILITY: TRANSPORTATION INSECURITY
IN THE PAST 12 MONTHS, HAS LACK OF TRANSPORTATION KEPT YOU FROM MEETINGS, WORK, OR FROM GETTING THINGS NEEDED FOR DAILY LIVING?: NO

## 2019-12-05 SDOH — ECONOMIC STABILITY: TRANSPORTATION INSECURITY
IN THE PAST 12 MONTHS, HAS THE LACK OF TRANSPORTATION KEPT YOU FROM MEDICAL APPOINTMENTS OR FROM GETTING MEDICATIONS?: YES

## 2019-12-05 ASSESSMENT — LIFESTYLE VARIABLES
AUDITORY DISTURBANCES: NOT PRESENT
AUDITORY DISTURBANCES: NOT PRESENT
TREMOR: NO TREMOR
TREMOR: NO TREMOR
PAROXYSMAL SWEATS: NO SWEAT VISIBLE
NAUSEA AND VOMITING: NO NAUSEA AND NO VOMITING
VISUAL DISTURBANCES: NOT PRESENT
PAROXYSMAL SWEATS: NO SWEAT VISIBLE
ANXIETY: NO ANXIETY (AT EASE)
PAROXYSMAL SWEATS: NO SWEAT VISIBLE
TREMOR: NO TREMOR
ORIENTATION AND CLOUDING OF SENSORIUM: ORIENTED AND CAN DO SERIAL ADDITIONS
HEADACHE, FULLNESS IN HEAD: NOT PRESENT
AUDITORY DISTURBANCES: NOT PRESENT
HEADACHE, FULLNESS IN HEAD: NOT PRESENT
AGITATION: NORMAL ACTIVITY
NAUSEA AND VOMITING: NO NAUSEA AND NO VOMITING
VISUAL DISTURBANCES: NOT PRESENT
NAUSEA AND VOMITING: NO NAUSEA AND NO VOMITING
AGITATION: NORMAL ACTIVITY
ANXIETY: NO ANXIETY (AT EASE)
HEADACHE, FULLNESS IN HEAD: NOT PRESENT
TOTAL SCORE: 0
ANXIETY: NO ANXIETY (AT EASE)
AGITATION: NORMAL ACTIVITY
TOTAL SCORE: 0
NAUSEA AND VOMITING: NO NAUSEA AND NO VOMITING
ORIENTATION AND CLOUDING OF SENSORIUM: ORIENTED AND CAN DO SERIAL ADDITIONS
TREMOR: NO TREMOR
TOTAL SCORE: 0
TOTAL SCORE: 0
VISUAL DISTURBANCES: NOT PRESENT
HEADACHE, FULLNESS IN HEAD: NOT PRESENT
VISUAL DISTURBANCES: NOT PRESENT
ORIENTATION AND CLOUDING OF SENSORIUM: ORIENTED AND CAN DO SERIAL ADDITIONS
ANXIETY: NO ANXIETY (AT EASE)
AGITATION: NORMAL ACTIVITY
AUDITORY DISTURBANCES: NOT PRESENT
ORIENTATION AND CLOUDING OF SENSORIUM: ORIENTED AND CAN DO SERIAL ADDITIONS
PAROXYSMAL SWEATS: NO SWEAT VISIBLE

## 2019-12-05 ASSESSMENT — ENCOUNTER SYMPTOMS
NAUSEA: 0
COUGH: 0
SHORTNESS OF BREATH: 0
ABDOMINAL PAIN: 0
VOMITING: 0
DEPRESSION: 1
DIZZINESS: 0
WEAKNESS: 0

## 2019-12-05 NOTE — PROGRESS NOTES
Diabetes education: Met with pt this afternoon. Please see consult note.  Plan:  Pt states he will take the insulin in the hospital but he will not take insulin at home.  Pt agrees to take another pill ( Glimpiride/Glipizide) in addition to the Metformin at discharge.  CDE will continue to follow . Please call 0900 if needs change. Not sure if pt will be able to get his pills and meter from his former employer.

## 2019-12-05 NOTE — DISCHARGE PLANNING
"Care Transition Team Assessment  LSW met with pt at bedside to complete assessment and discuss discharge plans/barriers. Pt's goal is to discharge home. He explained he's been living with friends and does not have a place of his own. He reported previously was living in [place] with family. Pt reported his friend would be able to provide transport on Friday but otherwise he would need assistance with transportation if discharged earlier.     Pt is 54 year old male currently homeless/couch surfing with friends. He reported he is independent with ADL/iADLs and typically takes public transport. 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 He reported his alcohol use increased with the loss of his job. He reported ultimately his plan is to return to [place] where his family/relatives live.     Information Source  Orientation : Oriented x 4   Information Given By: Patient  Informant's Name: Ranjitclaireambrose Gamez   Who is responsible for making decisions for patient? : Patient    Readmission Evaluation  Is this a readmission?: No    Elopement Risk  Legal Hold: No  Ambulatory or Self Mobile in Wheelchair: Yes  Disoriented: No  Psychiatric Symptoms: None  History of Wandering: No  Elopement this Admit: No  Vocalizing Wanting to Leave: No  Displays Behaviors, Body Language Wanting to Leave: No-Not at Risk for Elopement  Elopement Risk: Not at Risk for Elopement    Interdisciplinary Discharge Planning  Does Admitting Nurse Feel This Could be a Complex Discharge?: No  Primary Care Physician: None  Lives with - Patient's Self Care Capacity: Alone and Unable to Care For Self  Patient or legal guardian wants to designate a caregiver (see row info): No  Support Systems: Family Member(s), Friends / Neighbors  Housing / Facility: Other (Comments)  Name of Care Facility: \"[facility]\"  Do You Take your Prescribed Medications Regularly: Yes  Able to " Return to Previous ADL's: Yes  Mobility Issues: No  Prior Services: None  Assistance Needed: No  Durable Medical Equipment: Not Applicable    Discharge Preparedness  What is your plan after discharge?: Home with help  What are your discharge supports?: Other (comment)(Friend)  Prior Functional Level: Ambulatory, Independent with Activities of Daily Living, Independent with Medication Management  Difficulity with ADLs: None  Difficulity with IADLs: None    Functional Assesment  Prior Functional Level: Ambulatory, Independent with Activities of Daily Living, Independent with Medication Management    Finances  Financial Barriers to Discharge: Yes  Average Monthly Income: $0  Source of Income: None  Prescription Coverage: No  Prescription Coverage Comments: Pt has no insurance     Vision / Hearing Impairment  Vision Impairment : Yes  Right Eye Vision: Impaired, Wears Glasses  Left Eye Vision: Impaired, Wears Glasses  Hearing Impairment : No    Advance Directive  Advance Directive?: None    Domestic Abuse  Have you ever been the victim of abuse or violence?: No  Physical Abuse or Sexual Abuse: No  Verbal Abuse or Emotional Abuse: No  Possible Abuse Reported to:: Not Applicable    Psychological Assessment  History of Substance Abuse: Alcohol  Date Last Used - Alcohol: 12/03/19  Substance Abuse Comments: Reported increase drinking with the loss of this job  History of Psychiatric Problems: No    Discharge Risks or Barriers  Discharge risks or barriers?: No PCP, Uninsured / underinsured, Complex medical needs, Homeless / couch surfing  Patient risk factors: Complex medical needs, Homeless, Lack of outside supports, No PCP, Uninsured or underinsured    Anticipated Discharge Information  Anticipated discharge disposition: Home  Discharge Address: Friend's home. Does not have address  Discharge Contact Phone Number: [phone]

## 2019-12-05 NOTE — CARE PLAN
Problem: Venous Thromboembolism (VTW)/Deep Vein Thrombosis (DVT) Prevention:  Goal: Patient will participate in Venous Thrombosis (VTE)/Deep Vein Thrombosis (DVT)Prevention Measures  Outcome: PROGRESSING AS EXPECTED  Note:   Patient will continue to participate in VTE prevention measures. Patient educated on importance of VTE. Patient receiving heparin subq for VTE.       Problem: Pain Management  Goal: Pain level will decrease to patient's comfort goal  Outcome: PROGRESSING AS EXPECTED  Note:   Patient will remain comfortable. Patient has no current complaints of pain. Patient educated to inform RN of pain levels.

## 2019-12-05 NOTE — PROGRESS NOTES
Received report from Rafaela ARTEAGA, at pt bedside. Pt asking about time of stress test, POC discussed. Call light and belongings within reach. Bed locked and in low position. Alarm on and fall precautions in place.

## 2019-12-05 NOTE — CARE PLAN
Problem: Communication  Goal: The ability to communicate needs accurately and effectively will improve  Outcome: PROGRESSING AS EXPECTED   Patient call light within reach. Patient is able to communicate needs  Problem: Safety  Goal: Will remain free from injury  Outcome: PROGRESSING AS EXPECTED   Patient remains free from falls   Problem: Knowledge Deficit  Goal: Knowledge of disease process/condition, treatment plan, diagnostic tests, and medications will improve  Outcome: PROGRESSING AS EXPECTED   Patient verbalizes understanding

## 2019-12-05 NOTE — PROGRESS NOTES
Hospital Medicine Daily Progress Note    Date of Service  12/4/2019    Chief Complaint  54 y.o. male admitted 12/1/2019 with chest pain and ETOH WD    Hospital Course    see below      Interval Problem Update  CP-intermittent at the base of sternum and sometimes over the L breast. He is agreeable to get a NM cardiac stress test tomorrow. NO issues on tele last night.     DM-he is a known diabetic but patient did not tell us on admission. Educator saw patient but he refuses to take home insulin and will only take insulin while in the hospital. Sugars remain quite elevated.     ETOH WD-CIWA scores remain quite low, but he still feels quite fatigued.     Consultants/Specialty  none    Code Status  fcfc    Disposition  TELE    Review of Systems  Review of Systems   Constitutional: Positive for malaise/fatigue (very small improvement).   Respiratory: Negative for cough and shortness of breath.    Cardiovascular: Positive for chest pain (intermittent over the mid sternal area). Negative for leg swelling.   Gastrointestinal: Negative for diarrhea.   Genitourinary: Negative for dysuria.   Neurological: Positive for weakness (no appreciable improvement). Negative for tingling and tremors.   Psychiatric/Behavioral: Positive for depression.   All other systems reviewed and are negative.       Physical Exam  Temp:  [36.6 °C (97.8 °F)-37.3 °C (99.2 °F)] 36.6 °C (97.8 °F)  Pulse:  [] 106  Resp:  [14-18] 16  BP: (145-165)/(79-94) 160/92  SpO2:  [95 %-97 %] 97 %    Physical Exam  Vitals signs and nursing note reviewed.   Constitutional:       General: He is not in acute distress.     Appearance: He is well-developed.      Comments: Remains fatigued appearing, but a bit more conversant today   HENT:      Head: Normocephalic and atraumatic.      Nose: No congestion.      Mouth/Throat:      Pharynx: No oropharyngeal exudate.   Eyes:      General: No scleral icterus.     Pupils: Pupils are equal, round, and reactive to light.    Neck:      Musculoskeletal: Normal range of motion and neck supple.      Thyroid: No thyromegaly.   Cardiovascular:      Rate and Rhythm: Regular rhythm. Tachycardia present.      Heart sounds: Normal heart sounds. No murmur.   Pulmonary:      Effort: Pulmonary effort is normal. No respiratory distress.      Breath sounds: Rales (unchanged aeration) present.   Abdominal:      General: Bowel sounds are normal. There is no distension.      Palpations: Abdomen is soft.      Tenderness: There is no tenderness.   Musculoskeletal: Normal range of motion.         General: No swelling.   Skin:     General: Skin is warm and dry.      Findings: No rash.   Neurological:      Mental Status: He is alert and oriented to person, place, and time.      Cranial Nerves: No cranial nerve deficit.         Fluids    Intake/Output Summary (Last 24 hours) at 12/4/2019 1941  Last data filed at 12/4/2019 1935  Gross per 24 hour   Intake 540 ml   Output 1850 ml   Net -1310 ml       Laboratory  Recent Labs     12/03/19  0318   WBC 5.2   RBC 4.18*   HEMOGLOBIN 14.0   HEMATOCRIT 39.1*   MCV 93.5   MCH 33.5*   MCHC 35.8*   RDW 37.0   PLATELETCT 110*   MPV 9.3     Recent Labs     12/03/19  0318 12/04/19  0216   SODIUM 131* 129*   POTASSIUM 3.1* 3.7   CHLORIDE 94* 95*   CO2 25 23   GLUCOSE 163* 189*   BUN 9 11   CREATININE 0.76 0.79   CALCIUM 8.1* 8.4*                   Imaging  EC-ECHOCARDIOGRAM COMPLETE W/O CONT   Final Result      US-RUQ   Final Result      1.  Sludge within the gallbladder. No gallstones or biliary ductal dilatation.      2.  Fatty change of the liver.      3.  Small right upper pole renal cyst.      DX-CHEST-PORTABLE (1 VIEW)   Final Result      No evidence of acute cardiopulmonary process.      NM-CARDIAC STRESS TEST    (Results Pending)        Assessment/Plan  Chest pain- (present on admission)  Assessment & Plan  -intermittent now, no issues on TELE, somewhat atypical in nature, but does have multiple CVD risk factors.    Serial troponin and EKG.  -ECHO is normal  -NM cardiac stress test has been refused by patient for unclear reasons, but he is now agreeable, ordered for tomorrow AM, NPO @ 12am  -no issues on tele, TROPS are ok    Type 2 diabetes mellitus without complication, without long-term current use of insulin (HCC)- (present on admission)  Assessment & Plan  -A1c is 10.1 indicating new diagnosis, will order DM educator, saw patient today, refuses to utilize home insulin  -continue ISS, goal BS less than 180, remains quite hyperglycemic  -start lantus 5 units QHS, increase to 5 units BID  -change diet to diabetic  -will need outpatient metformin at max dose along with glipizide or glimepride    Elevated liver enzymes- (present on admission)  Assessment & Plan  -asymptomatic, monitor and trend daily, avoid obvious hepatotoxins  It most likely secondary to alcohol abuse.  RUQ US only with gallbladder sludge, otherwise unremarkable  -check HEP panel-negative  -LFTs continue to improve with ETOH cessation    Alcohol abuse- (present on admission)  Assessment & Plan  -continue CIWA, still actively withdrawing, but noted daily improvement  -lytes are abnormal, replace K+ & PO4(-) aggressively-added back more supplementation  -monitor closely  -MVI/thiamine, folate         VTE prophylaxis: heparin

## 2019-12-05 NOTE — PROGRESS NOTES
"NYDIA Leger met with pt in hospital 12/4. Pt said that he is from [place], has been living in [facility] but is new to the [facility] within this year. Pt does not have a PCP and would like help finding one and getting an appt. Pt says that he takes the bus and has lack of transportation to medical appts. CHW informed pt of MTM. Pt does not have family here in the state but has friends that he said are \"somewhat of a support system.\" Pt mentioned that he has been depressed lately and will be staying at a friends house until he can find work again. Pt was in and out of [employer] but has lost this job prior to hospitalization. 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 Pt was also interested in a home visit for food rx. Pt is confident in managing his own health and does not need CCM RN referral.     Community Health Worker Intake  • Social determinates of health intake completed  • Identified barriers to food, housing and transportation  • Contact information provided to Thaddeus    Plan:   NYDIA Leger will do f/u phone call with pt after d/c. [phone]. Will try to schedule home visit for food rx. Pt also asked for help with finding work as a caretaker.      "

## 2019-12-05 NOTE — DIETARY
"Nutrition Services: Update   Day 2 of admit.  Thaddeus Adan is a 54 y.o. male with admitting DX of Chest pain  Chest pain    RD 2nd attempted to provide diabetes diet education as pt initially declined on 12/4. Pt states has had hx of diabetes for awhile, though does not need additional resources. Mentions will take medication and diabetes is only a problem when he is \"out of control\", does not elaborate on what pt means by this. RD provided therapeutic listening and empathy as appropriate. RD unable to obtain more information from pt, pt insists would like to sleep at this time. RD provided packet for review and outpatient services, pt agrees will review.     Please consult for additional education per pt interest.     RD available PRN.     "

## 2019-12-05 NOTE — CONSULTS
"Diabetes education: Met with pt who has a hx of diabetes on Metformin prior to admit. Pt has hx of alcohol use.  Pt states he has a meter and tests his blood sugars every other day, but meter is currently with his medications in a \"bag\" in his locker at work. Chart states pt has currently lost his job.  Pt was admitted with blood sugar of 293 and Hg A1c of 10.1%.  Pt is currently on regular insulin sliding scale coverage ac and hs with blood sugars of 212 ( 2 units), 334 ( 4 units) and 227 ( 2 units).  Plan:  Pt states he will take the insulin in the hospital but he will not take insulin at home.  Pt agrees to take another pill ( Glimpiride/Glipizide) in addition to the Metformin at discharge.  CDE will continue to follow . Please call [phone] if needs change. Not sure if pt will be able to get his pills and meter from his former employer.  "

## 2019-12-06 VITALS
DIASTOLIC BLOOD PRESSURE: 76 MMHG | SYSTOLIC BLOOD PRESSURE: 123 MMHG | HEIGHT: 68 IN | BODY MASS INDEX: 23.22 KG/M2 | RESPIRATION RATE: 16 BRPM | HEART RATE: 101 BPM | TEMPERATURE: 98.1 F | OXYGEN SATURATION: 94 % | WEIGHT: 153.22 LBS

## 2019-12-06 PROBLEM — R74.8 ELEVATED LIVER ENZYMES: Status: RESOLVED | Noted: 2019-12-02 | Resolved: 2019-12-06

## 2019-12-06 PROBLEM — R07.9 CHEST PAIN: Status: RESOLVED | Noted: 2019-12-02 | Resolved: 2019-12-06

## 2019-12-06 LAB
GLUCOSE BLD-MCNC: 142 MG/DL (ref 65–99)
GLUCOSE BLD-MCNC: 283 MG/DL (ref 65–99)

## 2019-12-06 PROCEDURE — 99239 HOSP IP/OBS DSCHRG MGMT >30: CPT | Performed by: INTERNAL MEDICINE

## 2019-12-06 PROCEDURE — A9270 NON-COVERED ITEM OR SERVICE: HCPCS | Performed by: INTERNAL MEDICINE

## 2019-12-06 PROCEDURE — 700102 HCHG RX REV CODE 250 W/ 637 OVERRIDE(OP): Performed by: INTERNAL MEDICINE

## 2019-12-06 PROCEDURE — 700111 HCHG RX REV CODE 636 W/ 250 OVERRIDE (IP): Performed by: INTERNAL MEDICINE

## 2019-12-06 PROCEDURE — 82962 GLUCOSE BLOOD TEST: CPT

## 2019-12-06 RX ORDER — AMLODIPINE BESYLATE 10 MG/1
10 TABLET ORAL DAILY
Qty: 30 TAB | Refills: 1 | Status: SHIPPED | OUTPATIENT
Start: 2019-12-07 | End: 2019-12-06 | Stop reason: SDUPTHER

## 2019-12-06 RX ORDER — GLIPIZIDE 5 MG/1
5 TABLET ORAL 2 TIMES DAILY
Qty: 60 TAB | Refills: 1 | Status: SHIPPED
Start: 2019-12-06 | End: 2019-12-15

## 2019-12-06 RX ORDER — GLIPIZIDE 5 MG/1
5 TABLET ORAL 2 TIMES DAILY
Qty: 60 TAB | Refills: 1 | Status: SHIPPED | OUTPATIENT
Start: 2019-12-06 | End: 2019-12-06 | Stop reason: SDUPTHER

## 2019-12-06 RX ORDER — LOSARTAN POTASSIUM 100 MG/1
100 TABLET ORAL DAILY
Qty: 30 TAB | Refills: 1 | Status: SHIPPED | OUTPATIENT
Start: 2019-12-07 | End: 2019-12-06 | Stop reason: SDUPTHER

## 2019-12-06 RX ORDER — OMEPRAZOLE 20 MG/1
20 CAPSULE, DELAYED RELEASE ORAL DAILY
Qty: 30 CAP | Refills: 1 | Status: SHIPPED
Start: 2019-12-07 | End: 2019-12-15

## 2019-12-06 RX ORDER — AMLODIPINE BESYLATE 10 MG/1
10 TABLET ORAL DAILY
Qty: 30 TAB | Refills: 1 | Status: SHIPPED
Start: 2019-12-07 | End: 2019-12-15

## 2019-12-06 RX ORDER — LOSARTAN POTASSIUM 100 MG/1
100 TABLET ORAL DAILY
Qty: 30 TAB | Refills: 1 | Status: SHIPPED
Start: 2019-12-07 | End: 2019-12-15

## 2019-12-06 RX ORDER — OMEPRAZOLE 20 MG/1
20 CAPSULE, DELAYED RELEASE ORAL DAILY
Qty: 30 CAP | Refills: 1 | Status: SHIPPED | OUTPATIENT
Start: 2019-12-07 | End: 2019-12-06 | Stop reason: SDUPTHER

## 2019-12-06 RX ORDER — LOPERAMIDE HYDROCHLORIDE 2 MG/1
2 CAPSULE ORAL 4 TIMES DAILY PRN
Qty: 30 CAP | Refills: 0 | Status: SHIPPED | OUTPATIENT
Start: 2019-12-06 | End: 2019-12-06 | Stop reason: SDUPTHER

## 2019-12-06 RX ORDER — LOPERAMIDE HYDROCHLORIDE 2 MG/1
2 CAPSULE ORAL 4 TIMES DAILY PRN
Qty: 30 CAP | Refills: 0 | Status: SHIPPED
Start: 2019-12-06 | End: 2019-12-15

## 2019-12-06 RX ADMIN — INSULIN HUMAN 3 UNITS: 100 INJECTION, SOLUTION PARENTERAL at 11:46

## 2019-12-06 RX ADMIN — METFORMIN HYDROCHLORIDE 1000 MG: 500 TABLET ORAL at 08:25

## 2019-12-06 RX ADMIN — INSULIN GLARGINE 5 UNITS: 100 INJECTION, SOLUTION SUBCUTANEOUS at 06:47

## 2019-12-06 RX ADMIN — OMEPRAZOLE 20 MG: 20 CAPSULE, DELAYED RELEASE ORAL at 05:24

## 2019-12-06 RX ADMIN — AMLODIPINE BESYLATE 10 MG: 10 TABLET ORAL at 05:24

## 2019-12-06 RX ADMIN — LOSARTAN POTASSIUM 100 MG: 50 TABLET, FILM COATED ORAL at 05:24

## 2019-12-06 RX ADMIN — HEPARIN SODIUM 5000 UNITS: 5000 INJECTION, SOLUTION INTRAVENOUS; SUBCUTANEOUS at 05:24

## 2019-12-06 NOTE — CARE PLAN
Problem: Safety  Goal: Will remain free from falls  Outcome: PROGRESSING AS EXPECTED  Intervention: Assess risk factors for falls  Flowsheets (Taken 12/5/2019 3014)  Pt Calls for Assistance: Yes  Fall Risk: High Risk to Fall - 2 or more points   History of fall: 0  Mobility Status Assessment: 1-1 Healthcare Provider Required for Assistance with Ambulation & Transfer  Risk for Injury-Any positive answers results in the pt being at high risk for fall related injury: Alcohol Abuse  Note:   Fall precautions in place. Bed in lowest position. Non-skid socks in place. Personal possessions within reach. Mobility sign on door. Bed-alarm is on. Call light within reach. Pt educated regarding fall prevention and states understanding.       Problem: Knowledge Deficit  Goal: Knowledge of disease process/condition, treatment plan, diagnostic tests, and medications will improve  Outcome: PROGRESSING AS EXPECTED  Intervention: Assess knowledge level of disease process/condition, treatment plan, diagnostic tests, and medications  Note:   Pt educated regarding plan of care and medications. All questions answered.

## 2019-12-06 NOTE — DISCHARGE PLANNING
Anticipated Discharge Disposition:   · Home-[address] [place] [address]    Action:   · Pt discharging today, reported he was unable to reach his friend who he plans on staying with for a ride home. Pt requesting bus pass to get to location.   · LSW provided BSN with bus pass for pt upon discharge. Bus pass number [id number]    Barriers to Discharge:   · None     Plan:   · Pt to discharge home via bus today.

## 2019-12-06 NOTE — DISCHARGE INSTRUCTIONS
Discharge patient Home by Michael Schafer MD  Diet diabetic with 9-13 servings of fruits and vegetables  Activities as tolerated  Follow ups with PCP in 7-10 days, call for appointment  Meds per med rec sheet  No smoking, no alcohol, no caffeine  Wear seat belt in motorized vehicle  Take medications as perscribed  Keep appointments  If symptoms worsen call PCP, 911 or urgent care.    Discharge Instructions    Discharged to home by taxi with self. Discharged via wheelchair, hospital escort: Yes.  Special equipment needed: Not Applicable    Be sure to schedule a follow-up appointment with your primary care doctor or any specialists as instructed.     Discharge Plan:   Diet Plan: Discussed  Activity Level: Discussed  Smoking Cessation Offered: Patient Refused  Confirmed Follow up Appointment: Patient to Call and Schedule Appointment  Confirmed Symptoms Management: Discussed  Medication Reconciliation Updated: Yes  Influenza Vaccine Indication: Patient Refuses    I understand that a diet low in cholesterol, fat, and sodium is recommended for good health. Unless I have been given specific instructions below for another diet, I accept this instruction as my diet prescription.   Other diet: Regualr    Special Instructions: None    · Is patient discharged on Warfarin / Coumadin?   No     Depression / Suicide Risk    As you are discharged from this [facility] facility, it is important to learn how to keep safe from harming yourself.    Recognize the warning signs:  · Abrupt changes in personality, positive or negative- including increase in energy   · Giving away possessions  · Change in eating patterns- significant weight changes-  positive or negative  · Change in sleeping patterns- unable to sleep or sleeping all the time   · Unwillingness or inability to communicate  · Depression  · Unusual sadness, discouragement and loneliness  · Talk of wanting to die  · Neglect of personal appearance   · Rebelliousness- reckless  behavior  · Withdrawal from people/activities they love  · Confusion- inability to concentrate     If you or a loved one observes any of these behaviors or has concerns about self-harm, here's what you can do:  · Talk about it- your feelings and reasons for harming yourself  · Remove any means that you might use to hurt yourself (examples: pills, rope, extension cords, firearm)  · Get professional help from the community (Mental Health, Substance Abuse, psychological counseling)  · Do not be alone:Call your Safe Contact- someone whom you trust who will be there for you.  · Call your local CRISIS HOTLINE 621-3469 or 460-842-6212  · Call your local Children's Mobile Crisis Response Team [place] (301) 411-0194 or www.[employer]  · Call the toll free National Suicide Prevention Hotlines   · National Suicide Prevention Lifeline 071-313-JTSF (0899)  · [employer] 800-UISRLMH (962-6367)    Chest Pain, Nonspecific  It is often hard to give a specific diagnosis for the cause of chest pain. There is always a chance that your pain could be related to something serious, like a heart attack or a blood clot in the lungs. You need to follow up with your caregiver for further evaluation. More lab tests or other studies such as X-rays, electrocardiography, stress testing, or cardiac imaging may be needed to find the cause of your pain.  Most of the time, nonspecific chest pain improves within 2 to 3 days with rest and mild pain medicine. For the next few days, avoid physical exertion or activities that bring on pain. Do not smoke. Avoid drinking alcohol. Call your caregiver for routine follow-up as advised.   SEEK IMMEDIATE MEDICAL CARE IF:  · You develop increased chest pain or pain that radiates to the arm, neck, jaw, back, or abdomen.   · You develop shortness of breath, increased coughing, or you start coughing up blood.   · You have severe back or abdominal pain, nausea, or vomiting.   · You develop  severe weakness, fainting, fever, or chills.   Document Released: 12/18/2006 Document Revised: 03/11/2013 Document Reviewed: 06/06/2008  ExitCare® Patient Information ©2013 [employer].    Alcohol, Frequently Asked Questions  WHAT IS ALCOHOL?  Ethyl alcohol, or ethanol, affects mood or behavior (psychoactive). It is a drug found in beer, wine, and hard liquor. Hard liquor is whiskey, vodka, gin, etc. Alcohol is produced by the fermentation of yeast, sugars, and starches.   WHAT DOES ALCOHOL DO TO THE BODY?  · Alcohol is a central nervous system depressant.   · It is rapidly absorbed from the stomach and small intestine. It passes into the bloodstream. It is then widely distributed throughout the body.   · Harmful effects of alcohol, can include:   · Impaired judgment.   · Reduced reaction time.   · Slurred speech.   · Difficulty walking.   · The effects of alcohol on the body are directly related to the amount consumed.   · In small amounts, alcohol can have a relaxing effect.   · When consumed rapidly and in large amounts, alcohol can also result in coma and death.   · Alcohol can interact with a number of prescription and non-prescription medications in ways that can intensify the effect of alcohol, of the medications themselves, or both.   · Alcohol use by pregnant women can cause serious damage to the developing fetus.   WHAT IS A STANDARD DRINK?  A standard drink is one 12 ounce beer, one 5 ounce glass of wine, or one 1.5 ounce shot of distilled spirits. Each of these drinks contains about half an ounce of alcohol.   IS BEER OR WINE SAFER TO DRINK THAN HARD LIQUOR?   No. One 12 ounce beer has about the same amount of alcohol as one 5 ounce glass of wine, or one 1.5 ounce shot of liquor.   WHAT IS MODERATE DRINKING?  Based on current U.S. Government dietary guidelines, moderate drinking for women is defined as an average of 1 drink or less per day. Moderate drinking for men is defined as an average of 2 drinks  or less per day.   WHAT IS HEAVY DRINKING?  Heavy drinking is consuming alcohol in excess of 1 drink per day on average for women and greater than 2 drinks per day on average for men.   WHAT IS BINGE DRINKING?  Binge drinking is generally defined as having 5 or more drinks on one occasion. One occasion means in a row or within a short period of time. However, among women, binge drinking is often defined as having 4 or more drinks on one occasion. This lower cut-point is used for women because women are generally of smaller stature than men.   WHAT IS ALCOHOLISM?  Alcoholism is a primary, long-lasting (chronic) disease with inherited (genetic) tendencies. Alcoholism has psychological and social (psychosocial), and environmental factors influencing its development and signs and symptoms. The disease often becomes more severe (progressive) and eventually fatal. It is characterized by continuous or periodic:  · Lack of control over drinking.   · Fixation with the drug alcohol.   · Use of alcohol despite harmful consequences.   · Distortions in thinking, including denial.   WHAT IS ALCOHOL ABUSE?  Alcohol abuse is characterized by recurrent alcohol-related problems, including problems with relationships, job performance, or both; the use of alcohol in hazardous situations (e.g., while driving a car); or some combination of these.  WHY ARE SOME PEOPLE MORE SENSITIVE TO ALCOHOL THAN OTHERS?  Individual reactions to alcohol can vary greatly, and may be influenced by many factors, including:  · Age.   · Gender.   · Race.   · A specific origin or culture (ethnicity).   · Physical condition.   · The amount of food eaten before drinking.   · The use of drugs or medicines.   · Family history of alcohol problems.   · Many other factors as well.   Therefore, while drinking guidelines and definitions of drinking patterns can be very helpful in identifying risky patterns of alcohol use, personal decisions about whether to drink, and  "if so, when and how much, should take into account these individual factors as well.   WHAT DOES IT MEAN TO DRINK TOO MUCH?  · A person may be said to be \"drinking too much\" or engaging in \"excessive drinking\" if they exceed the guidelines for average or daily alcohol consumption.   · Among men, excessive drinking may be defined as more than 4 drinks per day or an average of more than 2 drinks per day over a 7 or 30 day period.   · Among women, excessive drinking may be defined as more than 3 drinks per day or an average of more than 1 drink per day over a 7 or 30 day period.   · Current guidelines state that some individuals (youth under age 21 years, pregnant women, and persons recovering from alcoholism) should not drink at all. Among these people, any alcohol consumption may be too much.   · Anyone who chooses to drink should be aware that individual reactions to alcohol can vary greatly. When unsure about drinking, it is best to consult one's own personal caregiver.   WHAT DOES IT MEAN TO GET DRUNK?  Drunkenness is the state of being intoxicated by consumption of alcoholic beverages to a degree that mental and physical faculties are noticeably impaired. However, the number of drinks that an individual needs to consume to get drunk varies based on a number of factors. These include: age, gender, physical condition, the amount of food eaten before drinking, and the use of drugs or medicines. Binge drinking typically results in intoxication.   WHAT DOES IT MEAN TO BE ABOVE THE LEGAL LIMIT FOR DRINKING?  Legal limits for operating a vehicle are defined by a government agency. Blood or breath tests are used to find out how much alcohol is in your system. If you are found to have more alcohol in your system than is allowed in your region, you will be punished by law. This does not mean it is safe to drive under the legal limit.  WHAT ARE COMMON HEALTH EFFECTS OF DRINKING TOO MUCH?  Excessive drinking, including binge " and heavy drinking, has numerous chronic (long-term) and acute (short-term) health effects.  Chronic health consequences of excessive drinking can include:  · Damage to liver cells (liver cirrhosis).   · Inflammation of the pancreas (pancreatitis).   · Various cancers, including:   · Liver cancer.   · Mouth cancer.   · Throat cancer.   · Voice box (larynx) cancer.   · Esophageal cancer.   · High blood pressure.   · Psychological disorders.   · Sudden (acute) health consequences of excessive drinking can include:   · Alcohol poisoning and death.   · Motor vehicle injuries.   · Falls.   · Domestic violence.   · Rape.   · Child abuse.   HOW DO I KNOW IF IT IS OKAY TO DRINK?  If you choose to drink alcohol, do so in moderation. Remember that there are some people who should not drink alcohol at all. These people include:  · Children and adolescents.   · People who cannot restrict their drinking to moderate levels.   · Women who may become pregnant or who are pregnant.   · People who plan to drive or operate machinery.   · People taking prescription or over-the-counter medications that can interact with alcohol.   When in doubt, it is always best to consult one's own caregiver.  HOW DO I KNOW IF I HAVE A DRINKING PROBLEM?  Drinking is a problem if it causes trouble in:  · Your relationships.   · School.   · Social activities.   · How you think and feel.   If you are concerned that either you or someone in your family might have a drinking problem, consult your caregivers. There are many resources available to assist people with drinking problems.  Document Released: 12/20/2004 Document Revised: 06/18/2013 Document Reviewed: 12/11/2009  ExitCare® Patient Information ©2013 [facility].    Diabetes, Frequently Asked Questions  WHAT IS DIABETES?  Most of the food we eat is turned into glucose (sugar). Our bodies use it for energy. The pancreas makes a hormone called insulin. It helps glucose get into the cells of our  bodies. When you have diabetes, your body either does not make enough insulin or cannot use its own insulin as well as it should. This causes sugars to build up in your blood.  WHAT ARE THE SYMPTOMS OF DIABETES?  · Frequent urination.   · Excessive thirst.   · Unexplained weight loss.   · Extreme hunger.   · Blurred vision.   · Tingling or numbness in hands or feet.   · Feeling very tired much of the time.   · Dry, itchy skin.   · Sores that are slow to heal.   · Yeast infections.   WHAT ARE THE TYPES OF DIABETES?  Type 1 Diabetes   · About 10% of affected people have this type.   · Usually occurs before the age of 30.   · Usually occurs in thin to normal weight people.   Type 2 Diabetes  · About 90% of affected people have this type.   · Usually occurs after the age of 40.   · Usually occurs in overweight people.   · More likely to have:   · A family history of diabetes.   · A history of diabetes during pregnancy (gestational diabetes).   · High blood pressure.   · High cholesterol and triglycerides.   Gestational Diabetes  · Occurs in about 4% of pregnancies.   · Usually goes away after the baby is born.   · More likely to occur in women with:   · Family history of diabetes.   · Previous gestational diabetes.   · Obese.   · Over 25 years old.   WHAT IS PRE-DIABETES?  Pre-diabetes means your blood glucose is higher than normal, but lower than the diabetes range. It also means you are at risk of getting type 2 diabetes and heart disease. If you are told you have pre-diabetes, have your blood glucose checked again in 1 to 2 years.  WHAT IS THE TREATMENT FOR DIABETES?  Treatment is aimed at keeping blood glucose near normal levels at all times. Learning how to manage this yourself is important in treating diabetes. Depending on the type of diabetes you have, your treatment will include one or more of the following:  · Monitoring your blood glucose.   · Meal planning.   · Exercise.   · Oral medicine (pills) or insulin.    CAN DIABETES BE PREVENTED?  With type 1 diabetes, prevention is more difficult, because the triggers that cause it are not yet known.  With type 2 diabetes, prevention is more likely, with lifestyle changes:  · Maintain a healthy weight.   · Eat healthy.   · Exercise.   IS THERE A CURE FOR DIABETES?  No, there is no cure for diabetes. There is a lot of research going on that is looking for a cure, and progress is being made. Diabetes can be treated and controlled. People with diabetes can manage their diabetes and lead normal, active lives.  SHOULD I BE TESTED FOR DIABETES?  If you are at least 45 years old, you should be tested for diabetes. You should be tested again every 3 years. If you are 45 or older and overweight, you may want to get tested more often. If you are younger than 45, overweight, and have one or more of the following risk factors, you should be tested:  · Family history of diabetes.   · Inactive lifestyle.   · High blood pressure.   WHAT ARE SOME OTHER SOURCES FOR INFORMATION ON DIABETES?  The following organizations may help in your search for more information on diabetes:  National Diabetes Education Program (NDEP)  Internet: http://www.ndep.nih.gov/resources  American Diabetes Association  Internet: http://www.diabetes.org   Juvenile Diabetes Foundation International  Internet: http://www.jdf.org  Document Released: 12/20/2004 Document Revised: 03/11/2013 Document Reviewed: 10/15/2010  ExitCare® Patient Information ©2013 [employer].    Amlodipine; Aliskiren; Hydrochlorothiazide, HCTZ oral tablets  What is this medicine?  AMLODIPINE; ALISKIREN; HYDROCHLOROTHIAZIDE (am EDUARDO eric peen; a lis MIRNA carlene; ishmael droe klor oh THYE a zide) is a combination of a calcium channel blocker, a renin inhibitor and a diuretic. It is used to treat high blood pressure.  This medicine may be used for other purposes; ask your health care provider or pharmacist if you have questions.  COMMON BRAND NAME(S):  Amjayeurnpiyush  What should I tell my health care provider before I take this medicine?  They need to know if you have any of these conditions:  -dehydration  -diabetes  -heart disease  -kidney disease  -liver disease  -lupus  -an unusual or allergic reaction to amlodipine; aliskiren; hydrochlorothiazide, HCTZ, sulfa drugs, other medicines, foods, dyes, or preservatives  -pregnant or trying to get pregnant  -breast-feeding  How should I use this medicine?  Take this medicine by mouth with a glass of water. Follow the directions on the prescription label. You can take this medicine with or without food. However, you should always take it the same way each time. Take your medicine at regular intervals. Do not take it more often than directed. Do not stop taking except on your doctor's advice.  Talk to your pediatrician regarding the use of this medicine in children. Special care may be needed.  Overdosage: If you think you have taken too much of this medicine contact a poison control center or emergency room at once.  NOTE: This medicine is only for you. Do not share this medicine with others.  What if I miss a dose?  If you miss a dose, take it as soon as you can. If it is almost time for your next dose, take only that dose. Do not take double or extra doses.  What may interact with this medicine?  -alcohol  -atorvastatin  -barbiturates  -certain medicines for fungal infections like ketoconazole and itraconazole  -cholestyramine  -colestipol  -cyclosporine  -furosemide  -lithium  -medicines for blood pressure  -medicines for diabetes  -medicines that relax muscles for surgery  -narcotic medicines for pain  -NSAIDs, medicines for pain and inflammation, like ibuprofen or naproxen  -potassium supplements  -steroid medicines like prednisone or cortisone  This list may not describe all possible interactions. Give your health care provider a list of all the medicines, herbs, non-prescription drugs, or dietary supplements you  use. Also tell them if you smoke, drink alcohol, or use illegal drugs. Some items may interact with your medicine.  What should I watch for while using this medicine?  Visit your doctor for regular check ups. Check your blood pressure as directed. Ask your doctor what your blood pressure should be and when you should contact him or her.  Women should inform their doctor if they wish to become pregnant or think they might be pregnant. There is a potential for serious side effects to an unborn child. Talk to your health care professional or pharmacist for more information.  You may need to be on a special diet while taking this medicine. Ask your doctor.  Check with your doctor or health care professional if you get an attack of severe diarrhea, nausea and vomiting, or if you sweat a lot. The loss of too much body fluid can make it dangerous for you to take this medicine.  You may get drowsy or dizzy. Do not drive, use machinery, or do anything that needs mental alertness until you know how this medicine affects you. Do not stand or sit up quickly, especially if you are an older patient. This reduces the risk of dizzy or fainting spells. Alcohol may interfere with the effect of this medicine. Avoid alcoholic drinks.  This medicine may affect your blood sugar level. If you have diabetes, check with your doctor or health care professional before changing the dose of your diabetic medicine. Do not take this medicine if you have diabetes and are taking a medicine called an angiotensin-receptor-blocker (ARB) or angiotensin-converting-enzyme-inhibitor (ACE inhibitor). Talk to your doctor or health care professional for more information.  This medicine can make you more sensitive to the sun. Keep out of the sun. If you cannot avoid being in the sun, wear protective clothing and use sunscreen. Do not use sun lamps or tanning beds/booths.  What side effects may I notice from receiving this medicine?  Side effects that you  should report to your doctor or health care professional as soon as possible:  -allergic reactions like skin rash or hives, swelling of the hands, feet, face, lips, throat, or tongue  -breathing problems  -changes in vision  -chest pain or chest tightness  -eye pain  -feeling faint or lightheaded, falls  -low blood pressure  -muscle pain  -pain or difficulty passing urine  -redness, blistering, peeling or loosening of the skin, including inside the mouth  Side effects that usually do not require medical attention (report to your doctor or health care professional if they continue or are bothersome):  -change in sex drive or performance  -diarrhea  -flu-like symptoms  -headache  -upset stomach  This list may not describe all possible side effects. Call your doctor for medical advice about side effects. You may report side effects to FDA at 2-546-FDA-3380.  Where should I keep my medicine?  Keep out of the reach of children.  Store at room temperature between 15 and 30 degrees C (59 and 86 degrees F). Protect from moisture. Throw away any unused medicine after the expiration date.  NOTE: This sheet is a summary. It may not cover all possible information. If you have questions about this medicine, talk to your doctor, pharmacist, or health care provider.  © 2018 [employer] (2017-01-19 10:27:15)    Diabetes Mellitus and Food  It is important for you to manage your blood sugar (glucose) level. Your blood glucose level can be greatly affected by what you eat. Eating healthier foods in the appropriate amounts throughout the day at about the same time each day will help you control your blood glucose level. It can also help slow or prevent worsening of your diabetes mellitus. Healthy eating may even help you improve the level of your blood pressure and reach or maintain a healthy weight.  General recommendations for healthful eating and cooking habits include:  · Eating meals and snacks regularly. Avoid going  long periods of time without eating to lose weight.  · Eating a diet that consists mainly of plant-based foods, such as fruits, vegetables, nuts, legumes, and whole grains.  · Using low-heat cooking methods, such as baking, instead of high-heat cooking methods, such as deep frying.  Work with your dietitian to make sure you understand how to use the Nutrition Facts information on food labels.  How can food affect me?  Carbohydrates   Carbohydrates affect your blood glucose level more than any other type of food. Your dietitian will help you determine how many carbohydrates to eat at each meal and teach you how to count carbohydrates. Counting carbohydrates is important to keep your blood glucose at a healthy level, especially if you are using insulin or taking certain medicines for diabetes mellitus.  Alcohol   Alcohol can cause sudden decreases in blood glucose (hypoglycemia), especially if you use insulin or take certain medicines for diabetes mellitus. Hypoglycemia can be a life-threatening condition. Symptoms of hypoglycemia (sleepiness, dizziness, and disorientation) are similar to symptoms of having too much alcohol.  If your health care provider has given you approval to drink alcohol, do so in moderation and use the following guidelines:  · Women should not have more than one drink per day, and men should not have more than two drinks per day. One drink is equal to:  ¨ 12 oz of beer.  ¨ 5 oz of wine.  ¨ 1½ oz of hard liquor.  · Do not drink on an empty stomach.  · Keep yourself hydrated. Have water, diet soda, or unsweetened iced tea.  · Regular soda, juice, and other mixers might contain a lot of carbohydrates and should be counted.  What foods are not recommended?  As you make food choices, it is important to remember that all foods are not the same. Some foods have fewer nutrients per serving than other foods, even though they might have the same number of calories or carbohydrates. It is difficult to get  your body what it needs when you eat foods with fewer nutrients. Examples of foods that you should avoid that are high in calories and carbohydrates but low in nutrients include:  · Trans fats (most processed foods list trans fats on the Nutrition Facts label).  · Regular soda.  · Juice.  · Candy.  · Sweets, such as cake, pie, doughnuts, and cookies.  · Fried foods.  What foods can I eat?  Eat nutrient-rich foods, which will nourish your body and keep you healthy. The food you should eat also will depend on several factors, including:  · The calories you need.  · The medicines you take.  · Your weight.  · Your blood glucose level.  · Your blood pressure level.  · Your cholesterol level.  You should eat a variety of foods, including:  · Protein.  ¨ Lean cuts of meat.  ¨ Proteins low in saturated fats, such as fish, egg whites, and beans. Avoid processed meats.  · Fruits and vegetables.  ¨ Fruits and vegetables that may help control blood glucose levels, such as apples, mangoes, and yams.  · Dairy products.  ¨ Choose fat-free or low-fat dairy products, such as milk, yogurt, and cheese.  · Grains, bread, pasta, and rice.  ¨ Choose whole grain products, such as multigrain bread, whole oats, and brown rice. These foods may help control blood pressure.  · Fats.  ¨ Foods containing healthful fats, such as nuts, avocado, olive oil, canola oil, and fish.  Does everyone with diabetes mellitus have the same meal plan?  Because every person with diabetes mellitus is different, there is not one meal plan that works for everyone. It is very important that you meet with a dietitian who will help you create a meal plan that is just right for you.  This information is not intended to replace advice given to you by your health care provider. Make sure you discuss any questions you have with your health care provider.  Document Released: 09/14/2006 Document Revised: 05/25/2017 Document Reviewed: 11/14/2014  [employer]  Patient Education © 2017 [employer]      Glipizide; Metformin tablets  What is this medicine?  GLIPIZIDE; METFORMIN (GLIP i zide; met FOR min) helps to treat type 2 diabetes. Treatment is combined with diet and exercise. This medicine helps your body to use insulin better.  This medicine may be used for other purposes; ask your health care provider or pharmacist if you have questions.  COMMON BRAND NAME(S): Metaglip  What should I tell my health care provider before I take this medicine?  They need to know if you have any of these conditions:  -become easily dehydrated  -diabetic ketoacidosis  -glucose-6-phosphate dehydrogenase deficiency  -heart disease  -if you frequently drink alcohol containing drinks  -kidney disease  -liver disease  -polycystic ovaries  -severe infection or injury  -stroke  -thyroid disease  -undergoing surgery or certain x-ray procedures with injectable contrast agents  -an unusual or allergic reaction to glipizide, metformin, sulfa drugs, other medicines, foods, dyes, or preservatives  -pregnant or trying to get pregnant  -breast-feeding  How should I use this medicine?  Take this medicine by mouth with meals. Swallow with a drink of water. Follow the directions on the prescription label. Take your medicine at the same time each day. Do not take more often than directed.  Talk to your pediatrician regarding the use of this medicine in children. Special care may be needed.  Patients over 65 years old may need a smaller dose than younger adults.  Overdosage: If you think you have taken too much of this medicine contact a poison control center or emergency room at once.  NOTE: This medicine is only for you. Do not share this medicine with others.  What if I miss a dose?  If you miss a dose, take it as soon as you can. If it is almost time for your next dose, take only that dose. Do not take double or extra doses.  What may interact with this medicine?  Do not take this medicine with any of  the following medications:  -dofetilide  -gatifloxacin  -certain contrast medicines given before X-rays, CT scans, MRI, or other procedures  This medicine may also interact with the following medications:  -acetazolamide  -aspirin and aspirin-like drugs  -certain antiviral medicines for HIV infection or hepatitis  -chloramphenicol  -cimetidine  -clarithromycin  -crizotinib  -digoxin  -diuretics  -female hormones, like estrogens or progestins and birth control pills  -glycopyrrolate  -isoniazid  -lamotrigine  -medicines for blood pressure, heart disease, irregular heart beat  -medicines for fungal infections like fluconazole, ketoconazole, itraconazole, posaconazole, and voriconazole  -medicines called MAO Inhibitors like Nardil, Parnate, Marplan, Eldepryl  -memantine  -methazolamide  -midodrine  -morphine  -niacin  -NSAIDs, medicines for pain and inflammation, like ibuprofen or naproxen  -phenothiazines like chlorpromazine, mesoridazine, prochlorperazine, thioridazine  -phenytoin  -probenecid  -procainamide  -propantheline  -quinidine  -quinine  -ranitidine  -ranolazine  -steroid medicines like prednisone or cortisone  -stimulant medicines for attention disorders, weight loss, or to stay awake  -thyroid medicine  -topiramate  -trimethoprim  -trospium  -vancomycin  -vandetanib  -warfarin  -zonisamide  This list may not describe all possible interactions. Give your health care provider a list of all the medicines, herbs, non-prescription drugs, or dietary supplements you use. Also tell them if you smoke, drink alcohol, or use illegal drugs. Some items may interact with your medicine.  What should I watch for while using this medicine?  Visit your doctor or health care professional for regular checks on your progress.  A test called the HbA1C (A1C) will be monitored. This is a simple blood test. It measures your blood sugar control over the last 2 to 3 months. You will receive this test every 3 to 6 months.  Learn how  to check your blood sugar. Learn the symptoms of low and high blood sugar and how to manage them.  Always carry a quick-source of sugar with you in case you have symptoms of low blood sugar. Examples include hard sugar candy or glucose tablets. Make sure others know that you can choke if you eat or drink when you develop serious symptoms of low blood sugar, such as seizures or unconsciousness. They must get medical help at once.  Tell your doctor or health care professional if you have high blood sugar. You might need to change the dose of your medicine. If you are sick or exercising more than usual, you might need to change the dose of your medicine.  Do not skip meals. Ask your doctor or health care professional if you should avoid alcohol. Many nonprescription cough and cold products contain sugar or alcohol. These can affect blood sugar.  This medicine may cause ovulation in premenopausal women who do not have regular monthly periods. This may increase your chances of becoming pregnant. You should not take this medicine if you become pregnant or think you may be pregnant. Talk with your doctor or health care professional about your birth control options while taking this medicine. Contact your doctor or health care professional right away if think you are pregnant.  This medicine can make you more sensitive to the sun. Keep out of the sun. If you cannot avoid being in the sun, wear protective clothing and use sunscreen. Do not use sun lamps or sun tanning beds/booths.  Wear a medical ID bracelet or chain, and carry a card that describes your disease and details of your medicine and dosage times.  What side effects may I notice from receiving this medicine?  Side effects that you should report to your doctor or health care professional as soon as possible:  -allergic reactions like skin rash, itching or hives, swelling of the face, lips, or tongue  -breathing problems  -dark urine  -feeling faint or lightheaded,  falls  -fever, chills, sore throat  -muscle aches or pains  -nausea, vomiting  -signs and symptoms of low blood sugar such as feeling anxious, confusion, dizziness, increased hunger, unusually weak or tired, sweating, shakiness, cold, irritable, headache, blurred vision, fast heartbeat, loss of consciousness  -slow or irregular heartbeat  -unusual bleeding or bruising  -unusual stomach pain or upset  -unusually tired or weak  -yellowing of the eyes or skin  Side effects that usually do not require medical attention (report to your doctor or health care professional if they continue or are bothersome):  -diarrhea  -dizziness  -headache  -heartburn  -metallic taste in mouth  -nausea  -stomach gas  This list may not describe all possible side effects. Call your doctor for medical advice about side effects. You may report side effects to FDA at 8-181-FDA-3931.  Where should I keep my medicine?  Keep out of the reach of children.  Store at room temperature between 15 and 25 degrees C (59 and 77 degrees F). Keep container tightly closed and protect from light. Throw away any unused medicine after the expiration date.  NOTE: This sheet is a summary. It may not cover all possible information. If you have questions about this medicine, talk to your doctor, pharmacist, or health care provider.  © 2018 Elsevier/Gold Standard (2015-06-02 21:43:34)    Omeprazole tablets (OTC)  What is this medicine?  OMEPRAZOLE (oh ME pray zol) prevents the production of acid in the stomach. It is used to treat the symptoms of heartburn. You can buy this medicine without a prescription. This product is not for long-term use, unless otherwise directed by your doctor or health care professional.  This medicine may be used for other purposes; ask your health care provider or pharmacist if you have questions.  COMMON BRAND NAME(S): Prilosec OTC  What should I tell my health care provider before I take this medicine?  They need to know if you have  any of these conditions:  -black or bloody stools  -chest pain  -difficulty swallowing  -have had heartburn for over 3 months  -have heartburn with dizziness, lightheadedness or sweating  -liver disease  -lupus  -stomach pain  -unexplained weight loss  -vomiting with blood  -wheezing  -an unusual or allergic reaction to omeprazole, other medicines, foods, dyes, or preservatives  -pregnant or trying to get pregnant  -breast-feeding  How should I use this medicine?  Take this medicine by mouth. Follow the directions on the product label. If you are taking this medicine without a prescription, take one tablet every day. Do not use for longer than 14 days or repeat a course of treatment more often than every 4 months unless directed by a doctor or healthcare professional. Take your dose at regular intervals every 24 hours. Swallow the tablet whole with a drink of water. Do not crush, break or chew. This medicine works best if taken on an empty stomach 30 minutes before breakfast. If you are using this medicine with the prescription of your doctor or healthcare professional, follow the directions you were given. Do not take your medicine more often than directed.  Talk to your pediatrician regarding the use of this medicine in children. Special care may be needed.  Overdosage: If you think you have taken too much of this medicine contact a poison control center or emergency room at once.  NOTE: This medicine is only for you. Do not share this medicine with others.  What if I miss a dose?  If you miss a dose, take it as soon as you can. If it is almost time for your next dose, take only that dose. Do not take double or extra doses.  What may interact with this medicine?  Do not take this medicine with any of the following medications:  -atazanavir  -clopidogrel  -nelfinavir  This medicine may also interact with the following medications:  -ampicillin  -certain medicines for anxiety or sleep  -certain medicines that treat  or prevent blood clots like warfarin  -cyclosporine  -diazepam  -digoxin  -disulfiram  -iron salts  -methotrexate  -mycophenolate mofetil  -phenytoin  -prescription medicine for fungal or yeast infection like itraconazole, ketoconazole, voriconazole  -saquinavir  -tacrolimus  This list may not describe all possible interactions. Give your health care provider a list of all the medicines, herbs, non-prescription drugs, or dietary supplements you use. Also tell them if you smoke, drink alcohol, or use illegal drugs. Some items may interact with your medicine.  What should I watch for while using this medicine?  It can take several days before your heartburn gets better. Check with your doctor or health care professional if your condition does not start to get better, or if it gets worse.  Do not treat diarrhea with over the counter products. Contact your doctor if you have diarrhea that lasts more than 2 days or if it is severe and watery.  Do not treat yourself for heartburn with this medicine for more than 14 days in a row. You should only use this medicine for a 2-week treatment period once every 4 months. If your symptoms return shortly after your therapy is complete, or within the 4 month time frame, call your doctor or health care professional.  What side effects may I notice from receiving this medicine?  Side effects that you should report to your doctor or health care professional as soon as possible:  -allergic reactions like skin rash, itching or hives, swelling of the face, lips, or tongue  -bone, muscle or joint pain  -breathing problems  -chest pain or chest tightness  -dark yellow or brown urine  -diarrhea  -dizziness  -fast, irregular heartbeat  -feeling faint or lightheaded  -fever or sore throat  -muscle spasm  -palpitations  -rash on cheeks or arms that gets worse in the sun  -redness, blistering, peeling or loosening of the skin, including inside the mouth  -seizures  -tremors  -unusual bleeding or  bruising  -unusually weak or tired  -yellowing of the eyes or skin  Side effects that usually do not require medical attention (report to your doctor or health care professional if they continue or are bothersome):  -constipation  -dry mouth  -headache  -loose stools  -nausea  This list may not describe all possible side effects. Call your doctor for medical advice about side effects. You may report side effects to FDA at 5-197-WXJ-8788.  Where should I keep my medicine?  Keep out of the reach of children.  Store at room temperature between 20 and 25 degrees C (68 and 77 degrees F). Protect from light and moisture. Throw away any unused medicine after the expiration date.  NOTE: This sheet is a summary. It may not cover all possible information. If you have questions about this medicine, talk to your doctor, pharmacist, or health care provider.  © 2018 Elsevier/Gold Standard (2017-01-19 13:06:31)

## 2019-12-06 NOTE — DISCHARGE SUMMARY
Discharge Summary    CHIEF COMPLAINT ON ADMISSION  Chief Complaint   Patient presents with   • Chest Pain       Reason for Admission  EMS     Admission Date  12/1/2019    CODE STATUS  Full Code    HPI & HOSPITAL COURSE  This is a 54 y.o. male here with above medical issues.  He presented with chest pain, gross fatigue and ETOH intoxication with associated elevated liver enzymes. Patient was admitted to the general telemetry floor and his troponins remained negative.    He was placed on CIWA protocol and eventually his ETOH withdrawal resolved. Patient's TSH was ok.     His chest pain remained intermittent during this hospitalization. His ECHO had a normal LVEF of 65% and no wall motion abnormalities. He underwent NM cardiac stress test that was normal. I also increased his outpatient losartan to 100 mg daily and started norvasc due to poorly controlled HTN as well.     Patient was also noted to have poorly controlled Type 2 DM with an A1c of 10.0 and he REFUSES TO TAKE INSULIN EVEN AFTER EXTENSIVE CONVERSATIONS. I TALKED TO HIM ABOUT GLIPIZIDE AND HOW HE NEEDS TO EAT FOOD WITH THIS AND NOT DRINK ALCOHOL. HE EXPRESSED GOOD UNDERSTANDING.   see below     Therefore, he is discharged in fair and stable condition to home with close outpatient follow-up.    The patient met 2-midnight criteria for an inpatient stay at the time of discharge.    Discharge Date  12/6/19    FOLLOW UP ITEMS POST DISCHARGE  F/U with his PCP in 2 weeks    DISCHARGE DIAGNOSES  Active Problems:    Alcohol abuse POA: Yes    Type 2 diabetes mellitus without complication, without long-term current use of insulin (HCC) POA: Yes  Resolved Problems:    Chest pain POA: Yes    Elevated liver enzymes POA: Yes      FOLLOW UP  No future appointments.  No follow-up provider specified.    MEDICATIONS ON DISCHARGE     Medication List      START taking these medications      Instructions   amLODIPine 10 MG Tabs  Start taking on:  December 7, 2019  Commonly known  as:  NORVASC   Take 1 Tab by mouth every day.  Dose:  10 mg     glipiZIDE 5 MG Tabs  Commonly known as:  GLUCOTROL   Take 1 Tab by mouth 2 times a day.  Dose:  5 mg     omeprazole 20 MG delayed-release capsule  Start taking on:  December 7, 2019  Commonly known as:  PRILOSEC   Take 1 Cap by mouth every day.  Dose:  20 mg        CHANGE how you take these medications      Instructions   losartan 100 MG Tabs  Start taking on:  December 7, 2019  What changed:    · medication strength  · how much to take  Commonly known as:  COZAAR   Take 1 Tab by mouth every day.  Dose:  100 mg        CONTINUE taking these medications      Instructions   metformin 1000 MG tablet  Commonly known as:  GLUCOPHAGE   Take 1,000 mg by mouth 2 times a day, with meals.  Dose:  1,000 mg     multivitamin Tabs   Take 1 Tab by mouth every day.  Dose:  1 Tab            Allergies  No Known Allergies    DIET  Orders Placed This Encounter   Procedures   • Diet Order Diabetic     Standing Status:   Standing     Number of Occurrences:   1     Order Specific Question:   Diet:     Answer:   Diabetic [3]       ACTIVITY  As tolerated.  Weight bearing as tolerated    CONSULTATIONS  NONE    PROCEDURES  NM-CARDIAC STRESS TEST   Final Result      EC-ECHOCARDIOGRAM COMPLETE W/O CONT   Final Result      US-RUQ   Final Result      1.  Sludge within the gallbladder. No gallstones or biliary ductal dilatation.      2.  Fatty change of the liver.      3.  Small right upper pole renal cyst.      DX-CHEST-PORTABLE (1 VIEW)   Final Result      No evidence of acute cardiopulmonary process.            LABORATORY  Lab Results   Component Value Date    SODIUM 129 (L) 12/05/2019    POTASSIUM 4.3 12/05/2019    CHLORIDE 98 12/05/2019    CO2 23 12/05/2019    GLUCOSE 207 (H) 12/05/2019    BUN 13 12/05/2019    CREATININE 0.86 12/05/2019        Lab Results   Component Value Date    WBC 5.2 12/03/2019    HEMOGLOBIN 14.0 12/03/2019    HEMATOCRIT 39.1 (L) 12/03/2019    PLATELETCT 110  (L) 12/03/2019        Total time of the discharge process exceeds 34 minutes.

## 2019-12-06 NOTE — DISCHARGE PLANNING
Anticipated Discharge Disposition:   · Home    Action:   · LSW notified by BSN that pt is reporting he can not afford the cost of his medications. Pt is unemployed with no income and only qualifies for Emergency Medicaid as he is not a permanent resident.   · LSW followed up with [facility] on the following medications to get cost:  · Glipizide $8.08  · Losartan $8.16  · Amlodipine $7.72  · Total for above medications would be $23.96.   · LSW notified BSN that approved services would be completed for the above medications and requested BSN have attending, Dr. Schafer e-script medications to [facility].   · LSW faxed approved services form to [facility] ext [phone].    Barriers to Discharge:   · None    Plan:   · Pt to discharge home today via bus.

## 2019-12-06 NOTE — PROGRESS NOTES
Assumed care at 1900, bedside report received from michael RN. Pt. Is SR on the monitor. Initial assessment completed, orders reviewed, call light within reach, bed alarm is in use, and hourly rounding in place. POC addressed with patient, no additional questions at this time.

## 2019-12-06 NOTE — PROGRESS NOTES
Hospital Medicine Daily Progress Note    Date of Service  12/5/2019    Chief Complaint  54 y.o. male admitted 12/1/2019 with chest pain and ETOH WD    Hospital Course    see below      Interval Problem Update  CP-none last night. TELE with no acute events. .     DM-blood sugars remain elevated again. See below.     ETOH WD-resolved.      Consultants/Specialty  none    Code Status  fcfc    Disposition  TELE    Review of Systems  Review of Systems   Constitutional: Positive for malaise/fatigue (mild improvement).   Respiratory: Negative for cough and shortness of breath.    Cardiovascular: Negative for chest pain and leg swelling.   Gastrointestinal: Negative for abdominal pain, nausea and vomiting.   Genitourinary: Negative for dysuria.   Neurological: Negative for dizziness and weakness.   Psychiatric/Behavioral: Positive for depression.   All other systems reviewed and are negative.       Physical Exam  Temp:  [36.8 °C (98.2 °F)-37.6 °C (99.6 °F)] 36.8 °C (98.2 °F)  Pulse:  [89-95] 89  Resp:  [15-16] 15  BP: (141-166)/(81-95) 141/81  SpO2:  [93 %-98 %] 98 %    Physical Exam  Vitals signs and nursing note reviewed.   Constitutional:       General: He is not in acute distress.     Appearance: He is well-developed.      Comments: Little less fatigued today   HENT:      Head: Normocephalic and atraumatic.      Nose: No congestion.      Mouth/Throat:      Pharynx: No oropharyngeal exudate.   Eyes:      General: No scleral icterus.     Pupils: Pupils are equal, round, and reactive to light.   Neck:      Musculoskeletal: Normal range of motion and neck supple.      Thyroid: No thyromegaly.   Cardiovascular:      Rate and Rhythm: Regular rhythm. Tachycardia present.      Heart sounds: Normal heart sounds. No murmur.   Pulmonary:      Effort: Pulmonary effort is normal. No respiratory distress.      Breath sounds: Rales (unchanged aeration) present.      Comments: Unchanged aeration  Abdominal:      General: Bowel sounds  are normal. There is no distension.      Palpations: Abdomen is soft.      Tenderness: There is no tenderness.   Musculoskeletal: Normal range of motion.         General: No deformity.   Skin:     General: Skin is warm and dry.      Findings: No rash.   Neurological:      Mental Status: He is alert and oriented to person, place, and time.      Cranial Nerves: No cranial nerve deficit.         Fluids    Intake/Output Summary (Last 24 hours) at 12/5/2019 1656  Last data filed at 12/5/2019 0617  Gross per 24 hour   Intake --   Output 1500 ml   Net -1500 ml       Laboratory  Recent Labs     12/03/19  0318   WBC 5.2   RBC 4.18*   HEMOGLOBIN 14.0   HEMATOCRIT 39.1*   MCV 93.5   MCH 33.5*   MCHC 35.8*   RDW 37.0   PLATELETCT 110*   MPV 9.3     Recent Labs     12/03/19  0318 12/04/19  0216 12/05/19  0654   SODIUM 131* 129* 129*   POTASSIUM 3.1* 3.7 4.3   CHLORIDE 94* 95* 98   CO2 25 23 23   GLUCOSE 163* 189* 207*   BUN 9 11 13   CREATININE 0.76 0.79 0.86   CALCIUM 8.1* 8.4* 8.8                   Imaging  NM-CARDIAC STRESS TEST   Final Result      EC-ECHOCARDIOGRAM COMPLETE W/O CONT   Final Result      US-RUQ   Final Result      1.  Sludge within the gallbladder. No gallstones or biliary ductal dilatation.      2.  Fatty change of the liver.      3.  Small right upper pole renal cyst.      DX-CHEST-PORTABLE (1 VIEW)   Final Result      No evidence of acute cardiopulmonary process.           Assessment/Plan  Chest pain- (present on admission)  Assessment & Plan  -Normal ECHO and NM cardiac stress test  -likely MSK in nature or ETOH gastritis/acid reflux related    Type 2 diabetes mellitus without complication, without long-term current use of insulin (HCC)- (present on admission)  Assessment & Plan  -A1c is 10.1 indicating new diagnosis, will order DM educator, saw patient today, refuses to utilize home insulin  -continue ISS, goal BS less than 180, remains quite hyperglycemic again today with BS's in the high 200's  -start  metformin 1000 mg BID  -continue lantus 5 units BID  -change diet to diabetic  -will need outpatient metformin at max dose along with glipizide or glimepride    Elevated liver enzymes- (present on admission)  Assessment & Plan  -asymptomatic, monitor and trend daily, avoid obvious hepatotoxins  It most likely secondary to alcohol abuse.  RUQ US only with gallbladder sludge, otherwise unremarkable  -check HEP panel-negative  -LFTs continue to improve with ETOH cessation    Alcohol abuse- (present on admission)  Assessment & Plan  -resolved  -monitor closely  -MVI/thiamine, folate         VTE prophylaxis: heparin

## 2019-12-06 NOTE — DISCHARGE PLANNING
Meds-to-Beds: Discharge prescription orders listed below authorized by approved services and delivered to patient's bedside. RN notified. Patient counseled.       Thaddeus Adan   Home Medication Instructions TERRENCE:[id number]    Printed on:12/06/19 1424   Medication Information                      amLODIPine (NORVASC) 10 MG Tab  Take 1 Tab by mouth every day.             glipiZIDE (GLUCOTROL) 5 MG Tab  Take 1 Tab by mouth 2 times a day.             losartan (COZAAR) 100 MG Tab  Take 1 Tab by mouth every day.             metformin (GLUCOPHAGE) 1000 MG tablet  Take 1 Tab by mouth 2 times a day, with meals for 30 days.               Zeny Casas, PharmD

## 2019-12-06 NOTE — CARE PLAN
Problem: Communication  Goal: The ability to communicate needs accurately and effectively will improve  Outcome: PROGRESSING AS EXPECTED   Patient call light within reach, patient is able to communicate needs  Problem: Safety  Goal: Will remain free from injury  Outcome: PROGRESSING AS EXPECTED   Patient is steady on feet and remains free from injury  Problem: Knowledge Deficit  Goal: Knowledge of disease process/condition, treatment plan, diagnostic tests, and medications will improve  Outcome: PROGRESSING AS EXPECTED   Patient verbalizes understanding

## 2019-12-06 NOTE — PROGRESS NOTES
Pt being discharged. Pt educated on worsening symptoms, medications adherence, and worsening symptoms and received instructions. New prescriptions given and pt verbalized understanding of all medications. PIV removed. Monitor checked in, monitor room notified. All personal belongings with pt. Pt going home with self. RN to call transport for escort out. Will monitor pt until off unit.

## 2019-12-06 NOTE — PROGRESS NOTES
Received report from Ricky ARTEAGA, at pt bedside. Pt has no complaints at this time, patient sitting up for breakfast, POC discussed. Call light and belongings within reach. Bed locked and in low position.

## 2019-12-09 NOTE — PROGRESS NOTES
Pt has his meds and has no questions. Pt is currently staying a friend's uncle's house but was walking to a job interview. Pt said if he gets job, he will try to stay at a motel. Pt did say that he has a PCP appt next week but the info for when and where was back at the house. I will call back tomorrow to gather PCP appt details.

## 2019-12-15 ENCOUNTER — APPOINTMENT (OUTPATIENT)
Dept: RADIOLOGY | Facility: MEDICAL CENTER | Age: 54
DRG: 894 | End: 2019-12-15
Attending: EMERGENCY MEDICINE

## 2019-12-15 ENCOUNTER — HOSPITAL ENCOUNTER (INPATIENT)
Facility: MEDICAL CENTER | Age: 54
LOS: 1 days | DRG: 894 | End: 2019-12-16
Attending: EMERGENCY MEDICINE | Admitting: INTERNAL MEDICINE

## 2019-12-15 PROBLEM — N17.9 AKI (ACUTE KIDNEY INJURY) (HCC): Status: ACTIVE | Noted: 2019-12-15

## 2019-12-15 PROBLEM — I10 ESSENTIAL HYPERTENSION: Status: ACTIVE | Noted: 2019-12-15

## 2019-12-15 PROBLEM — Z72.0 TOBACCO ABUSE: Status: ACTIVE | Noted: 2019-12-15

## 2019-12-15 PROBLEM — F10.230 ALCOHOL DEPENDENCE WITH UNCOMPLICATED WITHDRAWAL (HCC): Status: ACTIVE | Noted: 2019-12-15

## 2019-12-15 LAB
ALBUMIN SERPL BCP-MCNC: 4.1 G/DL (ref 3.2–4.9)
ALBUMIN/GLOB SERPL: 1.2 G/DL
ALP SERPL-CCNC: 89 U/L (ref 30–99)
ALT SERPL-CCNC: 108 U/L (ref 2–50)
ANION GAP SERPL CALC-SCNC: 14 MMOL/L (ref 0–11.9)
AST SERPL-CCNC: 117 U/L (ref 12–45)
BASOPHILS # BLD AUTO: 3.6 % (ref 0–1.8)
BASOPHILS # BLD: 0.17 K/UL (ref 0–0.12)
BILIRUB SERPL-MCNC: 0.5 MG/DL (ref 0.1–1.5)
BUN SERPL-MCNC: 26 MG/DL (ref 8–22)
CALCIUM SERPL-MCNC: 9.2 MG/DL (ref 8.5–10.5)
CHLORIDE SERPL-SCNC: 109 MMOL/L (ref 96–112)
CO2 SERPL-SCNC: 20 MMOL/L (ref 20–33)
CREAT SERPL-MCNC: 1.57 MG/DL (ref 0.5–1.4)
EKG IMPRESSION: NORMAL
EOSINOPHIL # BLD AUTO: 0.22 K/UL (ref 0–0.51)
EOSINOPHIL NFR BLD: 4.6 % (ref 0–6.9)
ERYTHROCYTE [DISTWIDTH] IN BLOOD BY AUTOMATED COUNT: 42.4 FL (ref 35.9–50)
ETHANOL BLD-MCNC: 0.37 G/DL
GLOBULIN SER CALC-MCNC: 3.4 G/DL (ref 1.9–3.5)
GLUCOSE SERPL-MCNC: 131 MG/DL (ref 65–99)
HCT VFR BLD AUTO: 38.8 % (ref 42–52)
HGB BLD-MCNC: 13.4 G/DL (ref 14–18)
IMM GRANULOCYTES # BLD AUTO: 0.01 K/UL (ref 0–0.11)
IMM GRANULOCYTES NFR BLD AUTO: 0.2 % (ref 0–0.9)
LIPASE SERPL-CCNC: 47 U/L (ref 11–82)
LYMPHOCYTES # BLD AUTO: 1.64 K/UL (ref 1–4.8)
LYMPHOCYTES NFR BLD: 34.3 % (ref 22–41)
MCH RBC QN AUTO: 33.7 PG (ref 27–33)
MCHC RBC AUTO-ENTMCNC: 34.5 G/DL (ref 33.7–35.3)
MCV RBC AUTO: 97.5 FL (ref 81.4–97.8)
MONOCYTES # BLD AUTO: 0.7 K/UL (ref 0–0.85)
MONOCYTES NFR BLD AUTO: 14.6 % (ref 0–13.4)
NEUTROPHILS # BLD AUTO: 2.04 K/UL (ref 1.82–7.42)
NEUTROPHILS NFR BLD: 42.7 % (ref 44–72)
NRBC # BLD AUTO: 0 K/UL
NRBC BLD-RTO: 0 /100 WBC
PLATELET # BLD AUTO: 427 K/UL (ref 164–446)
PMV BLD AUTO: 8.5 FL (ref 9–12.9)
POTASSIUM SERPL-SCNC: 4.4 MMOL/L (ref 3.6–5.5)
PROT SERPL-MCNC: 7.5 G/DL (ref 6–8.2)
RBC # BLD AUTO: 3.98 M/UL (ref 4.7–6.1)
SODIUM SERPL-SCNC: 143 MMOL/L (ref 135–145)
TROPONIN T SERPL-MCNC: 16 NG/L (ref 6–19)
WBC # BLD AUTO: 4.8 K/UL (ref 4.8–10.8)

## 2019-12-15 PROCEDURE — 85025 COMPLETE CBC W/AUTO DIFF WBC: CPT

## 2019-12-15 PROCEDURE — 96376 TX/PRO/DX INJ SAME DRUG ADON: CPT

## 2019-12-15 PROCEDURE — 93005 ELECTROCARDIOGRAM TRACING: CPT | Performed by: EMERGENCY MEDICINE

## 2019-12-15 PROCEDURE — 93005 ELECTROCARDIOGRAM TRACING: CPT

## 2019-12-15 PROCEDURE — 71045 X-RAY EXAM CHEST 1 VIEW: CPT

## 2019-12-15 PROCEDURE — 80053 COMPREHEN METABOLIC PANEL: CPT

## 2019-12-15 PROCEDURE — 770020 HCHG ROOM/CARE - TELE (206)

## 2019-12-15 PROCEDURE — 84484 ASSAY OF TROPONIN QUANT: CPT

## 2019-12-15 PROCEDURE — 99285 EMERGENCY DEPT VISIT HI MDM: CPT

## 2019-12-15 PROCEDURE — 80307 DRUG TEST PRSMV CHEM ANLYZR: CPT

## 2019-12-15 PROCEDURE — 700105 HCHG RX REV CODE 258: Performed by: EMERGENCY MEDICINE

## 2019-12-15 PROCEDURE — 700111 HCHG RX REV CODE 636 W/ 250 OVERRIDE (IP): Performed by: EMERGENCY MEDICINE

## 2019-12-15 PROCEDURE — 94760 N-INVAS EAR/PLS OXIMETRY 1: CPT

## 2019-12-15 PROCEDURE — 99223 1ST HOSP IP/OBS HIGH 75: CPT | Mod: AI | Performed by: INTERNAL MEDICINE

## 2019-12-15 PROCEDURE — 700101 HCHG RX REV CODE 250: Performed by: INTERNAL MEDICINE

## 2019-12-15 PROCEDURE — 99407 BEHAV CHNG SMOKING > 10 MIN: CPT | Performed by: INTERNAL MEDICINE

## 2019-12-15 PROCEDURE — 96374 THER/PROPH/DIAG INJ IV PUSH: CPT

## 2019-12-15 PROCEDURE — 96375 TX/PRO/DX INJ NEW DRUG ADDON: CPT

## 2019-12-15 PROCEDURE — 83690 ASSAY OF LIPASE: CPT

## 2019-12-15 PROCEDURE — HZ2ZZZZ DETOXIFICATION SERVICES FOR SUBSTANCE ABUSE TREATMENT: ICD-10-PCS | Performed by: EMERGENCY MEDICINE

## 2019-12-15 RX ORDER — HEPARIN SODIUM 5000 [USP'U]/ML
5000 INJECTION, SOLUTION INTRAVENOUS; SUBCUTANEOUS EVERY 8 HOURS
Status: DISCONTINUED | OUTPATIENT
Start: 2019-12-16 | End: 2019-12-16 | Stop reason: HOSPADM

## 2019-12-15 RX ORDER — ONDANSETRON 4 MG/1
4 TABLET, ORALLY DISINTEGRATING ORAL EVERY 4 HOURS PRN
Status: DISCONTINUED | OUTPATIENT
Start: 2019-12-15 | End: 2019-12-16 | Stop reason: HOSPADM

## 2019-12-15 RX ORDER — THIAMINE MONONITRATE (VIT B1) 100 MG
100 TABLET ORAL DAILY
Status: DISCONTINUED | OUTPATIENT
Start: 2019-12-16 | End: 2019-12-16 | Stop reason: HOSPADM

## 2019-12-15 RX ORDER — LORAZEPAM 2 MG/ML
2 INJECTION INTRAMUSCULAR
Status: DISCONTINUED | OUTPATIENT
Start: 2019-12-15 | End: 2019-12-16 | Stop reason: HOSPADM

## 2019-12-15 RX ORDER — GLIPIZIDE 5 MG/1
5 TABLET ORAL 2 TIMES DAILY
COMMUNITY

## 2019-12-15 RX ORDER — OMEPRAZOLE 20 MG/1
20 CAPSULE, DELAYED RELEASE ORAL DAILY
Status: DISCONTINUED | OUTPATIENT
Start: 2019-12-16 | End: 2019-12-16 | Stop reason: HOSPADM

## 2019-12-15 RX ORDER — LORAZEPAM 2 MG/ML
1 INJECTION INTRAMUSCULAR
Status: DISCONTINUED | OUTPATIENT
Start: 2019-12-15 | End: 2019-12-16 | Stop reason: HOSPADM

## 2019-12-15 RX ORDER — LORAZEPAM 2 MG/1
2 TABLET ORAL
Status: DISCONTINUED | OUTPATIENT
Start: 2019-12-15 | End: 2019-12-16 | Stop reason: HOSPADM

## 2019-12-15 RX ORDER — LORAZEPAM 2 MG/ML
1 INJECTION INTRAMUSCULAR ONCE
Status: COMPLETED | OUTPATIENT
Start: 2019-12-15 | End: 2019-12-15

## 2019-12-15 RX ORDER — ASPIRIN 300 MG/1
300 SUPPOSITORY RECTAL DAILY
Status: DISCONTINUED | OUTPATIENT
Start: 2019-12-16 | End: 2019-12-16 | Stop reason: HOSPADM

## 2019-12-15 RX ORDER — ONDANSETRON 2 MG/ML
4 INJECTION INTRAMUSCULAR; INTRAVENOUS ONCE
Status: COMPLETED | OUTPATIENT
Start: 2019-12-15 | End: 2019-12-15

## 2019-12-15 RX ORDER — AMLODIPINE BESYLATE 10 MG/1
10 TABLET ORAL DAILY
COMMUNITY

## 2019-12-15 RX ORDER — AMLODIPINE BESYLATE 10 MG/1
10 TABLET ORAL DAILY
Status: DISCONTINUED | OUTPATIENT
Start: 2019-12-16 | End: 2019-12-16 | Stop reason: HOSPADM

## 2019-12-15 RX ORDER — AMOXICILLIN 250 MG
2 CAPSULE ORAL 2 TIMES DAILY
Status: DISCONTINUED | OUTPATIENT
Start: 2019-12-15 | End: 2019-12-16 | Stop reason: HOSPADM

## 2019-12-15 RX ORDER — ONDANSETRON 2 MG/ML
4 INJECTION INTRAMUSCULAR; INTRAVENOUS EVERY 4 HOURS PRN
Status: DISCONTINUED | OUTPATIENT
Start: 2019-12-15 | End: 2019-12-16 | Stop reason: HOSPADM

## 2019-12-15 RX ORDER — SODIUM CHLORIDE 9 MG/ML
1000 INJECTION, SOLUTION INTRAVENOUS ONCE
Status: COMPLETED | OUTPATIENT
Start: 2019-12-15 | End: 2019-12-15

## 2019-12-15 RX ORDER — LORAZEPAM 1 MG/1
0.5 TABLET ORAL EVERY 4 HOURS PRN
Status: DISCONTINUED | OUTPATIENT
Start: 2019-12-15 | End: 2019-12-16 | Stop reason: HOSPADM

## 2019-12-15 RX ORDER — LORAZEPAM 1 MG/1
1 TABLET ORAL EVERY 4 HOURS PRN
Status: DISCONTINUED | OUTPATIENT
Start: 2019-12-15 | End: 2019-12-16 | Stop reason: HOSPADM

## 2019-12-15 RX ORDER — LORAZEPAM 2 MG/ML
2 INJECTION INTRAMUSCULAR ONCE
Status: COMPLETED | OUTPATIENT
Start: 2019-12-15 | End: 2019-12-15

## 2019-12-15 RX ORDER — FOLIC ACID 1 MG/1
1 TABLET ORAL DAILY
Status: DISCONTINUED | OUTPATIENT
Start: 2019-12-16 | End: 2019-12-16 | Stop reason: HOSPADM

## 2019-12-15 RX ORDER — BISACODYL 10 MG
10 SUPPOSITORY, RECTAL RECTAL
Status: DISCONTINUED | OUTPATIENT
Start: 2019-12-15 | End: 2019-12-16 | Stop reason: HOSPADM

## 2019-12-15 RX ORDER — LABETALOL HYDROCHLORIDE 5 MG/ML
10 INJECTION, SOLUTION INTRAVENOUS EVERY 4 HOURS PRN
Status: DISCONTINUED | OUTPATIENT
Start: 2019-12-15 | End: 2019-12-16 | Stop reason: HOSPADM

## 2019-12-15 RX ORDER — LORAZEPAM 2 MG/ML
1.5 INJECTION INTRAMUSCULAR
Status: DISCONTINUED | OUTPATIENT
Start: 2019-12-15 | End: 2019-12-16 | Stop reason: HOSPADM

## 2019-12-15 RX ORDER — ASPIRIN 325 MG
325 TABLET ORAL DAILY
Status: DISCONTINUED | OUTPATIENT
Start: 2019-12-16 | End: 2019-12-16 | Stop reason: HOSPADM

## 2019-12-15 RX ORDER — ACETAMINOPHEN 325 MG/1
650 TABLET ORAL EVERY 6 HOURS PRN
Status: DISCONTINUED | OUTPATIENT
Start: 2019-12-15 | End: 2019-12-16 | Stop reason: HOSPADM

## 2019-12-15 RX ORDER — AMINOPHYLLINE 25 MG/ML
100 INJECTION, SOLUTION INTRAVENOUS
Status: DISCONTINUED | OUTPATIENT
Start: 2019-12-15 | End: 2019-12-16 | Stop reason: HOSPADM

## 2019-12-15 RX ORDER — LOSARTAN POTASSIUM 100 MG/1
100 TABLET ORAL DAILY
COMMUNITY

## 2019-12-15 RX ORDER — LORAZEPAM 2 MG/ML
0.5 INJECTION INTRAMUSCULAR EVERY 4 HOURS PRN
Status: DISCONTINUED | OUTPATIENT
Start: 2019-12-15 | End: 2019-12-16 | Stop reason: HOSPADM

## 2019-12-15 RX ORDER — OMEPRAZOLE 20 MG/1
20 CAPSULE, DELAYED RELEASE ORAL DAILY
COMMUNITY

## 2019-12-15 RX ORDER — LORAZEPAM 2 MG/1
4 TABLET ORAL
Status: DISCONTINUED | OUTPATIENT
Start: 2019-12-15 | End: 2019-12-16 | Stop reason: HOSPADM

## 2019-12-15 RX ORDER — ASPIRIN 81 MG/1
324 TABLET, CHEWABLE ORAL DAILY
Status: DISCONTINUED | OUTPATIENT
Start: 2019-12-16 | End: 2019-12-16 | Stop reason: HOSPADM

## 2019-12-15 RX ORDER — POLYETHYLENE GLYCOL 3350 17 G/17G
1 POWDER, FOR SOLUTION ORAL
Status: DISCONTINUED | OUTPATIENT
Start: 2019-12-15 | End: 2019-12-16 | Stop reason: HOSPADM

## 2019-12-15 RX ORDER — REGADENOSON 0.08 MG/ML
0.4 INJECTION, SOLUTION INTRAVENOUS
Status: DISCONTINUED | OUTPATIENT
Start: 2019-12-15 | End: 2019-12-16 | Stop reason: HOSPADM

## 2019-12-15 RX ORDER — SODIUM CHLORIDE, SODIUM LACTATE, POTASSIUM CHLORIDE, CALCIUM CHLORIDE 600; 310; 30; 20 MG/100ML; MG/100ML; MG/100ML; MG/100ML
INJECTION, SOLUTION INTRAVENOUS CONTINUOUS
Status: DISCONTINUED | OUTPATIENT
Start: 2019-12-15 | End: 2019-12-16 | Stop reason: HOSPADM

## 2019-12-15 RX ADMIN — LORAZEPAM 2 MG: 2 INJECTION INTRAMUSCULAR; INTRAVENOUS at 22:21

## 2019-12-15 RX ADMIN — ONDANSETRON 4 MG: 2 INJECTION INTRAMUSCULAR; INTRAVENOUS at 22:21

## 2019-12-15 RX ADMIN — LORAZEPAM 1 MG: 2 INJECTION INTRAMUSCULAR; INTRAVENOUS at 20:15

## 2019-12-15 RX ADMIN — SODIUM CHLORIDE 1000 ML: 9 INJECTION, SOLUTION INTRAVENOUS at 17:56

## 2019-12-15 RX ADMIN — THIAMINE HYDROCHLORIDE: 100 INJECTION, SOLUTION INTRAMUSCULAR; INTRAVENOUS at 23:54

## 2019-12-15 ASSESSMENT — ENCOUNTER SYMPTOMS
BLURRED VISION: 0
ABDOMINAL PAIN: 0
DIZZINESS: 0
FOCAL WEAKNESS: 0
NAUSEA: 0
SHORTNESS OF BREATH: 1
PALPITATIONS: 0
HEADACHES: 0
SPUTUM PRODUCTION: 0
WHEEZING: 0
DIAPHORESIS: 0
CHILLS: 0
DIARRHEA: 0
BLOOD IN STOOL: 0
SEIZURES: 0
BRUISES/BLEEDS EASILY: 0
NECK PAIN: 0
VOMITING: 0
COUGH: 1
MYALGIAS: 0
FEVER: 0
SORE THROAT: 0
FLANK PAIN: 0
BACK PAIN: 0
NERVOUS/ANXIOUS: 1

## 2019-12-15 ASSESSMENT — LIFESTYLE VARIABLES
DO YOU DRINK ALCOHOL: YES
HAVE YOU EVER FELT YOU SHOULD CUT DOWN ON YOUR DRINKING: YES
HAVE PEOPLE ANNOYED YOU BY CRITICIZING YOUR DRINKING: YES
ANXIETY: MODERATELY ANXIOUS OR GUARDED, SO ANXIETY IS INFERRED
TOTAL SCORE: 4
NAUSEA AND VOMITING: *
HOW MANY TIMES IN THE PAST YEAR HAVE YOU HAD 5 OR MORE DRINKS IN A DAY: 30
TOTAL SCORE: 4
TOTAL SCORE: 18
ORIENTATION AND CLOUDING OF SENSORIUM: ORIENTED AND CAN DO SERIAL ADDITIONS
EVER HAD A DRINK FIRST THING IN THE MORNING TO STEADY YOUR NERVES TO GET RID OF A HANGOVER: YES
EVER_SMOKED: YES
VISUAL DISTURBANCES: VERY MILD SENSITIVITY
SUBSTANCE_ABUSE: 1
HEADACHE, FULLNESS IN HEAD: MODERATELY SEVERE
ON A TYPICAL DAY WHEN YOU DRINK ALCOHOL HOW MANY DRINKS DO YOU HAVE: 6
CONSUMPTION TOTAL: POSITIVE
TREMOR: MODERATE TREMOR WITH ARMS EXTENDED
AVERAGE NUMBER OF DAYS PER WEEK YOU HAVE A DRINK CONTAINING ALCOHOL: 7
AUDITORY DISTURBANCES: MILD HARSHNESS OR ABILITY TO FRIGHTEN
PAROXYSMAL SWEATS: NO SWEAT VISIBLE
EVER FELT BAD OR GUILTY ABOUT YOUR DRINKING: YES
TOTAL SCORE: 4
AGITATION: NORMAL ACTIVITY

## 2019-12-15 ASSESSMENT — COPD QUESTIONNAIRES
COPD SCREENING SCORE: 4
DURING THE PAST 4 WEEKS HOW MUCH DID YOU FEEL SHORT OF BREATH: NONE/LITTLE OF THE TIME
DO YOU EVER COUGH UP ANY MUCUS OR PHLEGM?: NO/ONLY WITH OCCASIONAL COLDS OR INFECTIONS
HAVE YOU SMOKED AT LEAST 100 CIGARETTES IN YOUR ENTIRE LIFE: YES

## 2019-12-15 ASSESSMENT — PAIN DESCRIPTION - DESCRIPTORS: DESCRIPTORS: SHARP;ACHING

## 2019-12-15 NOTE — ED TRIAGE NOTES
Pt brought in via EMS with complaints of cp and sob. Pt is intoxicated  But alert and oriented. Pt is verbally aggressive at times then nice. Pt has large bruise across the lower abdomen and is unaware of how this happened.

## 2019-12-15 NOTE — ED PROVIDER NOTES
ED Provider Note    Scribed for Feng Guy M.D. by Aurora Valdivia. 12/15/2019, 1:48 PM.    Primary care provider: None noted  Means of arrival: EMS  History obtained from: Patient  History limited by: Alcohol intoxication     CHIEF COMPLAINT  Chief Complaint   Patient presents with   • Chest Pain   • Alcohol Intoxication     HPI  Thaddeus Adan is a 54 y.o. male who presents to the Emergency Department via EMS for evaluation of waxing and waning chest pain for the past 3 weeks. The patient reports that he was hospitalized for chest pain for 5 days three weeks ago. He states that his chest pain is unchanged from time time of his discharge. He rates the severity of his chest pain as 8/10. He endorses associated dyspnea, coughing, nausea, and vomiting. He denies any associated sore throat. He denies any history of heart attacks. The patient reports that he currently takes Metformin and Losartan for his diabetes. The patient reports that he has been drinking constantly for the past 3 days. He reports smoking less than 1 pack a day. He denies any other drug use.     Further history is limited secondary to the patient's alcohol intoxication.     REVIEW OF SYSTEMS  See HPI for further details. ROS is further limited secondary to the patient's alcohol intoxication.    PAST MEDICAL HISTORY   has a past medical history of Diabetes (HCC).    SURGICAL HISTORY  patient denies any surgical history    SOCIAL HISTORY  Social History     Tobacco Use   • Smoking status: Current Every Day Smoker     Packs/day: 0.00     Years: 25.00     Pack years: 0.00     Types: Cigarettes     Start date: 1995   • Smokeless tobacco: Never Used   Substance Use Topics   • Alcohol use: Yes     Frequency: Patient refused     Drinks per session: Patient refused     Binge frequency: Patient refused   • Drug use: Not Currently      Social History     Substance and Sexual Activity   Drug Use Not Currently     FAMILY HISTORY  Family History  "  Problem Relation Age of Onset   • Heart Disease Father      CURRENT MEDICATIONS  Reviewed.  See Encounter Summary.     ALLERGIES  No Known Allergies    PHYSICAL EXAM  VITAL SIGNS: /80   Pulse (!) 103   Temp 37.1 °C (98.8 °F) (Temporal)   Resp 17   Ht 1.676 m (5' 6\")   Wt 78 kg (172 lb)   SpO2 98%   BMI 27.76 kg/m²   Constitutional: Alert in no apparent distress. Appears intoxicated.  HENT: No signs of trauma, Bilateral external ears normal, Nose normal.   Eyes: Pupils are equal and reactive, Conjunctiva normal, Non-icteric.   Neck: Normal range of motion, No tenderness, Supple, No stridor.   Cardiovascular: Tachycardic. Regular rhythm, no murmurs. Reproducible midline chest pain with tenderness of palpation.  Thorax & Lungs: Normal breath sounds, No respiratory distress, No wheezing, No chest tenderness.   Abdomen: Bowel sounds normal, Soft, No tenderness, No masses, No pulsatile masses. No peritoneal signs.  Skin: Warm, Dry, No erythema, No rash.   Back: No bony tenderness, No CVA tenderness.   Extremities: Intact distal pulses, No edema, No tenderness, No cyanosis  Musculoskeletal: Good range of motion in all major joints. No tenderness to palpation or major deformities noted.   Neurologic: Alert , Normal motor function, Normal sensory function, No focal deficits noted.   Psychiatric: Affect normal, Judgment normal, Mood normal.     DIAGNOSTIC STUDIES / PROCEDURES     LABS  Results for orders placed or performed during the hospital encounter of 12/15/19   CBC WITH DIFFERENTIAL   Result Value Ref Range    WBC 4.8 4.8 - 10.8 K/uL    RBC 3.98 (L) 4.70 - 6.10 M/uL    Hemoglobin 13.4 (L) 14.0 - 18.0 g/dL    Hematocrit 38.8 (L) 42.0 - 52.0 %    MCV 97.5 81.4 - 97.8 fL    MCH 33.7 (H) 27.0 - 33.0 pg    MCHC 34.5 33.7 - 35.3 g/dL    RDW 42.4 35.9 - 50.0 fL    Platelet Count 427 164 - 446 K/uL    MPV 8.5 (L) 9.0 - 12.9 fL    Neutrophils-Polys 42.70 (L) 44.00 - 72.00 %    Lymphocytes 34.30 22.00 - 41.00 %    " Monocytes 14.60 (H) 0.00 - 13.40 %    Eosinophils 4.60 0.00 - 6.90 %    Basophils 3.60 (H) 0.00 - 1.80 %    Immature Granulocytes 0.20 0.00 - 0.90 %    Nucleated RBC 0.00 /100 WBC    Neutrophils (Absolute) 2.04 1.82 - 7.42 K/uL    Lymphs (Absolute) 1.64 1.00 - 4.80 K/uL    Monos (Absolute) 0.70 0.00 - 0.85 K/uL    Eos (Absolute) 0.22 0.00 - 0.51 K/uL    Baso (Absolute) 0.17 (H) 0.00 - 0.12 K/uL    Immature Granulocytes (abs) 0.01 0.00 - 0.11 K/uL    NRBC (Absolute) 0.00 K/uL   COMP METABOLIC PANEL   Result Value Ref Range    Sodium 143 135 - 145 mmol/L    Potassium 4.4 3.6 - 5.5 mmol/L    Chloride 109 96 - 112 mmol/L    Co2 20 20 - 33 mmol/L    Anion Gap 14.0 (H) 0.0 - 11.9    Glucose 131 (H) 65 - 99 mg/dL    Bun 26 (H) 8 - 22 mg/dL    Creatinine 1.57 (H) 0.50 - 1.40 mg/dL    Calcium 9.2 8.5 - 10.5 mg/dL    AST(SGOT) 117 (H) 12 - 45 U/L    ALT(SGPT) 108 (H) 2 - 50 U/L    Alkaline Phosphatase 89 30 - 99 U/L    Total Bilirubin 0.5 0.1 - 1.5 mg/dL    Albumin 4.1 3.2 - 4.9 g/dL    Total Protein 7.5 6.0 - 8.2 g/dL    Globulin 3.4 1.9 - 3.5 g/dL    A-G Ratio 1.2 g/dL   LIPASE   Result Value Ref Range    Lipase 47 11 - 82 U/L   TROPONIN   Result Value Ref Range    Troponin T 16 6 - 19 ng/L   DIAGNOSTIC ALCOHOL   Result Value Ref Range    Diagnostic Alcohol 0.37 (H) 0.00 g/dL   ESTIMATED GFR   Result Value Ref Range    GFR If  56 (A) >60 mL/min/1.73 m 2    GFR If Non  46 (A) >60 mL/min/1.73 m 2   EKG   Result Value Ref Range    Report       [facility] Emergency Dept.    Test Date:  2019-12-15  Pt Name:    DIAMOND LINK             Department: ER  MRN:        [id number]                      Room:       [facility]  Gender:     Male                         Technician: [id number]  :        1965                   Requested By:ER TRIAGE PROTOCOL  Order #:    [id number]                    Reading MD:    Measurements  Intervals                                Axis  Rate:       99                            P:          54  NE:         160                          QRS:        13  QRSD:       76                           T:          33  QT:         344  QTc:        442    Interpretive Statements  SINUS RHYTHM  LOW VOLTAGE IN FRONTAL LEADS  Compared to ECG 12/02/2019 01:49:44  Sinus tachycardia no longer present       All labs were reviewed by me.    EKG  12 Lead EKG interpreted by me to show:  Sinus rhythm  Rate 99  Axis: Normal  Intervals: Normal  No acute ST-T wave changes      RADIOLOGY  DX-CHEST-PORTABLE (1 VIEW)   Final Result         No acute cardiac or pulmonary abnormality is identified.        The radiologist's interpretation of all radiological studies and images have been reviewed by me.    COURSE & MEDICAL DECISION MAKING  Pertinent Labs & Imaging studies reviewed. (See chart for details)    1:48 PM - Patient seen and examined at bedside.Ordered EKG, DX-Chest, Diagnostic Alcohol, CBC with differential, CMP, Lipase, Troponin to evaluate his symptoms.     6:39 PM - Per RN, the patient ambulated to the bathroom and still had an unsteady gait.     Decision Making:  This is a 54 y.o. year old male who presents with above complaint.  Patient with evidence of BRIDGER.  Minimal improvement after 2 L of IV hydration.  Additional CIWA protocol and Ativan provided.  At this point the patient remains mildly tachycardic but otherwise medically cleared.  He has had recent cardiac rule out and troponin is again negative today.  I do believe that the patient can likely be discharged home if his vital signs and symptomatology and ambulatory abilities improved.  I have signed out to my colleague Dr. Ackerman which will continue to follow the patient clinically and disposition as clinically appropriate.    FINAL IMPRESSION  Alcohol intoxication    BRIDGER         I, Aurora Valdivia (Scribe), am scribing for, and in the presence of, Feng Guy M.D..    Electronically signed by: Aurora Valdivia  (Scribe), 12/15/2019    IFeng M.D. personally performed the services described in this documentation, as scribed by Aurora Valdivia in my presence, and it is both accurate and complete.    C.    The note accurately reflects work and decisions made by me.  Feng Guy  12/15/2019  8:16 PM

## 2019-12-16 ENCOUNTER — APPOINTMENT (OUTPATIENT)
Dept: RADIOLOGY | Facility: MEDICAL CENTER | Age: 54
DRG: 894 | End: 2019-12-16
Attending: INTERNAL MEDICINE

## 2019-12-16 VITALS
TEMPERATURE: 99.6 F | HEART RATE: 117 BPM | BODY MASS INDEX: 25.55 KG/M2 | OXYGEN SATURATION: 93 % | WEIGHT: 158.95 LBS | HEIGHT: 66 IN | RESPIRATION RATE: 18 BRPM | DIASTOLIC BLOOD PRESSURE: 95 MMHG | SYSTOLIC BLOOD PRESSURE: 159 MMHG

## 2019-12-16 LAB
ANION GAP SERPL CALC-SCNC: 12 MMOL/L (ref 0–11.9)
BUN SERPL-MCNC: 21 MG/DL (ref 8–22)
CALCIUM SERPL-MCNC: 8.4 MG/DL (ref 8.5–10.5)
CHLORIDE SERPL-SCNC: 107 MMOL/L (ref 96–112)
CO2 SERPL-SCNC: 22 MMOL/L (ref 20–33)
CREAT SERPL-MCNC: 1.26 MG/DL (ref 0.5–1.4)
D DIMER PPP IA.FEU-MCNC: 0.68 UG/ML (FEU) (ref 0–0.5)
EKG IMPRESSION: NORMAL
GLUCOSE BLD-MCNC: 181 MG/DL (ref 65–99)
GLUCOSE BLD-MCNC: 200 MG/DL (ref 65–99)
GLUCOSE SERPL-MCNC: 203 MG/DL (ref 65–99)
MAGNESIUM SERPL-MCNC: 1.1 MG/DL (ref 1.5–2.5)
PHOSPHATE SERPL-MCNC: 3.2 MG/DL (ref 2.5–4.5)
POTASSIUM SERPL-SCNC: 4.2 MMOL/L (ref 3.6–5.5)
SODIUM SERPL-SCNC: 141 MMOL/L (ref 135–145)
TROPONIN T SERPL-MCNC: 15 NG/L (ref 6–19)
TROPONIN T SERPL-MCNC: 15 NG/L (ref 6–19)

## 2019-12-16 PROCEDURE — 84484 ASSAY OF TROPONIN QUANT: CPT

## 2019-12-16 PROCEDURE — 80048 BASIC METABOLIC PNL TOTAL CA: CPT

## 2019-12-16 PROCEDURE — 93005 ELECTROCARDIOGRAM TRACING: CPT | Performed by: INTERNAL MEDICINE

## 2019-12-16 PROCEDURE — 82962 GLUCOSE BLOOD TEST: CPT | Mod: 91

## 2019-12-16 PROCEDURE — 84100 ASSAY OF PHOSPHORUS: CPT

## 2019-12-16 PROCEDURE — A9270 NON-COVERED ITEM OR SERVICE: HCPCS | Performed by: INTERNAL MEDICINE

## 2019-12-16 PROCEDURE — 85379 FIBRIN DEGRADATION QUANT: CPT

## 2019-12-16 PROCEDURE — 700105 HCHG RX REV CODE 258: Performed by: INTERNAL MEDICINE

## 2019-12-16 PROCEDURE — 700111 HCHG RX REV CODE 636 W/ 250 OVERRIDE (IP): Performed by: INTERNAL MEDICINE

## 2019-12-16 PROCEDURE — 83735 ASSAY OF MAGNESIUM: CPT

## 2019-12-16 PROCEDURE — 36415 COLL VENOUS BLD VENIPUNCTURE: CPT

## 2019-12-16 PROCEDURE — 700102 HCHG RX REV CODE 250 W/ 637 OVERRIDE(OP): Performed by: INTERNAL MEDICINE

## 2019-12-16 PROCEDURE — 93010 ELECTROCARDIOGRAM REPORT: CPT | Performed by: INTERNAL MEDICINE

## 2019-12-16 PROCEDURE — 99239 HOSP IP/OBS DSCHRG MGMT >30: CPT | Performed by: INTERNAL MEDICINE

## 2019-12-16 RX ORDER — MAGNESIUM SULFATE HEPTAHYDRATE 40 MG/ML
2 INJECTION, SOLUTION INTRAVENOUS ONCE
Status: COMPLETED | OUTPATIENT
Start: 2019-12-16 | End: 2019-12-16

## 2019-12-16 RX ADMIN — LORAZEPAM 0.5 MG: 1 TABLET ORAL at 08:32

## 2019-12-16 RX ADMIN — LORAZEPAM 0.5 MG: 1 TABLET ORAL at 04:49

## 2019-12-16 RX ADMIN — OMEPRAZOLE 20 MG: 20 CAPSULE, DELAYED RELEASE ORAL at 06:08

## 2019-12-16 RX ADMIN — FOLIC ACID 1 MG: 1 TABLET ORAL at 06:08

## 2019-12-16 RX ADMIN — MAGNESIUM SULFATE 2 G: 2 INJECTION INTRAVENOUS at 10:16

## 2019-12-16 RX ADMIN — AMLODIPINE BESYLATE 10 MG: 10 TABLET ORAL at 06:08

## 2019-12-16 RX ADMIN — INSULIN HUMAN 1 UNITS: 100 INJECTION, SOLUTION PARENTERAL at 06:08

## 2019-12-16 RX ADMIN — INSULIN HUMAN 1 UNITS: 100 INJECTION, SOLUTION PARENTERAL at 10:30

## 2019-12-16 RX ADMIN — THERA TABS 1 TABLET: TAB at 06:08

## 2019-12-16 RX ADMIN — Medication 100 MG: at 06:08

## 2019-12-16 RX ADMIN — ASPIRIN 325 MG: 325 TABLET, FILM COATED ORAL at 06:08

## 2019-12-16 RX ADMIN — SODIUM CHLORIDE, POTASSIUM CHLORIDE, SODIUM LACTATE AND CALCIUM CHLORIDE: 600; 310; 30; 20 INJECTION, SOLUTION INTRAVENOUS at 06:09

## 2019-12-16 RX ADMIN — HEPARIN SODIUM 5000 UNITS: 5000 INJECTION, SOLUTION INTRAVENOUS; SUBCUTANEOUS at 06:08

## 2019-12-16 ASSESSMENT — COGNITIVE AND FUNCTIONAL STATUS - GENERAL
DAILY ACTIVITIY SCORE: 18
STANDING UP FROM CHAIR USING ARMS: A LITTLE
DRESSING REGULAR UPPER BODY CLOTHING: A LITTLE
MOVING FROM LYING ON BACK TO SITTING ON SIDE OF FLAT BED: A LITTLE
MOBILITY SCORE: 17
EATING MEALS: A LITTLE
PERSONAL GROOMING: A LITTLE
CLIMB 3 TO 5 STEPS WITH RAILING: A LOT
SUGGESTED CMS G CODE MODIFIER MOBILITY: CK
WALKING IN HOSPITAL ROOM: A LITTLE
MOVING TO AND FROM BED TO CHAIR: A LITTLE
TURNING FROM BACK TO SIDE WHILE IN FLAT BAD: A LITTLE
SUGGESTED CMS G CODE MODIFIER DAILY ACTIVITY: CK
TOILETING: A LITTLE
HELP NEEDED FOR BATHING: A LITTLE
DRESSING REGULAR LOWER BODY CLOTHING: A LITTLE

## 2019-12-16 ASSESSMENT — LIFESTYLE VARIABLES
HEADACHE, FULLNESS IN HEAD: VERY MILD
HEADACHE, FULLNESS IN HEAD: NOT PRESENT
AGITATION: NORMAL ACTIVITY
TOTAL SCORE: 6
VISUAL DISTURBANCES: NOT PRESENT
NAUSEA AND VOMITING: *
PAROXYSMAL SWEATS: NO SWEAT VISIBLE
PAROXYSMAL SWEATS: *
NAUSEA AND VOMITING: MILD NAUSEA WITH NO VOMITING
TOTAL SCORE: 4
ORIENTATION AND CLOUDING OF SENSORIUM: ORIENTED AND CAN DO SERIAL ADDITIONS
ANXIETY: NO ANXIETY (AT EASE)
ORIENTATION AND CLOUDING OF SENSORIUM: ORIENTED AND CAN DO SERIAL ADDITIONS
VISUAL DISTURBANCES: NOT PRESENT
VISUAL DISTURBANCES: NOT PRESENT
AGITATION: NORMAL ACTIVITY
PAROXYSMAL SWEATS: *
AUDITORY DISTURBANCES: NOT PRESENT
NAUSEA AND VOMITING: *
AGITATION: NORMAL ACTIVITY
TREMOR: NO TREMOR
AUDITORY DISTURBANCES: NOT PRESENT
ANXIETY: NO ANXIETY (AT EASE)
TOTAL SCORE: 6
ANXIETY: MILDLY ANXIOUS
TREMOR: *
AUDITORY DISTURBANCES: NOT PRESENT
HEADACHE, FULLNESS IN HEAD: VERY MILD
ORIENTATION AND CLOUDING OF SENSORIUM: ORIENTED AND CAN DO SERIAL ADDITIONS
TREMOR: *

## 2019-12-16 ASSESSMENT — PATIENT HEALTH QUESTIONNAIRE - PHQ9
5. POOR APPETITE OR OVEREATING: NOT AT ALL
2. FEELING DOWN, DEPRESSED, IRRITABLE, OR HOPELESS: SEVERAL DAYS
1. LITTLE INTEREST OR PLEASURE IN DOING THINGS: SEVERAL DAYS
4. FEELING TIRED OR HAVING LITTLE ENERGY: NOT AT ALL
7. TROUBLE CONCENTRATING ON THINGS, SUCH AS READING THE NEWSPAPER OR WATCHING TELEVISION: NOT AT ALL
8. MOVING OR SPEAKING SO SLOWLY THAT OTHER PEOPLE COULD HAVE NOTICED. OR THE OPPOSITE, BEING SO FIGETY OR RESTLESS THAT YOU HAVE BEEN MOVING AROUND A LOT MORE THAN USUAL: NOT AT ALL
SUM OF ALL RESPONSES TO PHQ QUESTIONS 1-9: 2
9. THOUGHTS THAT YOU WOULD BE BETTER OFF DEAD, OR OF HURTING YOURSELF: NOT AT ALL
3. TROUBLE FALLING OR STAYING ASLEEP OR SLEEPING TOO MUCH: NOT AT ALL
6. FEELING BAD ABOUT YOURSELF - OR THAT YOU ARE A FAILURE OR HAVE LET YOURSELF OR YOUR FAMILY DOWN: NOT AL ALL
SUM OF ALL RESPONSES TO PHQ9 QUESTIONS 1 AND 2: 2

## 2019-12-16 NOTE — PROGRESS NOTES
Bedside report received. Patient A&O x4. REGGIE BARTON on the monitor. CIWA score 6.  No complaints of chest pain this morning. Dr. Burch notified of magnesium lab 1.1.  Orders received to administer 2g magnesium sulfate. POC discussed with patient. Patient verbalized understanding. Call light and belongings within reach. Bed locked and in lowest position, alarm and fall precautions in place.

## 2019-12-16 NOTE — DISCHARGE SUMMARY
Discharge Summary    CHIEF COMPLAINT ON ADMISSION  Chief Complaint   Patient presents with   • Chest Pain   • Alcohol Intoxication       Reason for Admission  EMS 31     Admission Date  12/15/2019    CODE STATUS  Full Code    HPI & HOSPITAL COURSE  This is a 54 y.o. male here with chest pain.  The patient has history of alcohol abuse, tobacco abuse and type 2 diabetes mellitus and hypertension. His chest pain has improved today.  He recently had stress test done and it was negative. His trop and ekg were benign. His dimer was elevated and CTA chest was ordered to rule out PE. But the patient refused to have it done and wants to leave AMA.   Patient will be discharged home today.  I saw and examined the patient today. Due to his alcohol abuse, he was asked whether he like to stay here for detox and he said no. He is alert and orientated and had capacity to make his medical decision. He was instructed to come to ER if symptoms get worse. The patient signed AMA paper and left.   Please call [phone] to schedule PCP appointment for patient.               Therefore, he is discharged in fair and stable condition to home with close outpatient follow-up.        Discharge Date  12/16/19      FOLLOW UP ITEMS POST DISCHARGE      DISCHARGE DIAGNOSES  Active Problems:    Chest pain POA: Yes    Alcohol dependence with uncomplicated withdrawal (HCC) POA: Yes    BRIDGER (acute kidney injury) (HCC) POA: Yes    Type 2 diabetes mellitus without complication, without long-term current use of insulin (HCC) POA: Yes    Essential hypertension POA: Yes    Tobacco abuse POA: Yes  Resolved Problems:    * No resolved hospital problems. *      FOLLOW UP  No future appointments.  PCP in 1 week            MEDICATIONS ON DISCHARGE     Medication List      CONTINUE taking these medications      Instructions   amLODIPine 10 MG Tabs  Commonly known as:  NORVASC   Take 10 mg by mouth every day.  Dose:  10 mg     glipiZIDE 5 MG Tabs  Commonly known  as:  GLUCOTROL   Take 5 mg by mouth 2 times a day.  Dose:  5 mg     losartan 100 MG Tabs  Commonly known as:  COZAAR   Take 100 mg by mouth every day.  Dose:  100 mg     metformin 1000 MG tablet  Commonly known as:  GLUCOPHAGE   Take 1,000 mg by mouth 2 times a day, with meals.  Dose:  1,000 mg     multivitamin Tabs   Take 1 Tab by mouth every day.  Dose:  1 Tab     omeprazole 20 MG delayed-release capsule  Commonly known as:  PRILOSEC   Take 20 mg by mouth every day.  Dose:  20 mg            Allergies  No Known Allergies    DIET  Orders Placed This Encounter   Procedures   • Diet Order Cardiac, Diabetic     Standing Status:   Standing     Number of Occurrences:   1     Order Specific Question:   Diet:     Answer:   Cardiac [6]     Order Specific Question:   Diet:     Answer:   Diabetic [3]     Order Specific Question:   Miscellaneous modifications:     Answer:   No Decaf, No Caffeine(for test) [11]       ACTIVITY  As tolerated.  Weight bearing as tolerated    CONSULTATIONS      PROCEDURES      LABORATORY  Lab Results   Component Value Date    SODIUM 141 12/16/2019    POTASSIUM 4.2 12/16/2019    CHLORIDE 107 12/16/2019    CO2 22 12/16/2019    GLUCOSE 203 (H) 12/16/2019    BUN 21 12/16/2019    CREATININE 1.26 12/16/2019        Lab Results   Component Value Date    WBC 4.8 12/15/2019    HEMOGLOBIN 13.4 (L) 12/15/2019    HEMATOCRIT 38.8 (L) 12/15/2019    PLATELETCT 427 12/15/2019        Total time of the discharge process exceeds 38 minutes.

## 2019-12-16 NOTE — ED NOTES
Patient placed on 3 liters nasal cannula. O2 sat while sleeping 88% on room air. Patient medicated per MAR

## 2019-12-16 NOTE — PROGRESS NOTES
Dr. Burch notified of positive D-Dimer result.  Orders received to order STAT CT scan.  Per Dr. Burch, if pt refuses he will be leaving AMA.  Pt brought in consent for contrast and education provided on reasoning for CT scan.  Pt educated that if he does not get the scan he will be leaving against medical advice. Pt verbalized understanding and verbalized that he would like to leave AMA.  Pt is alert and oriented x4 at this time. Dr. Burch notified.       Thaddeus Adan patient has chosen to leave the hospital against medical advice. The attending physician has not discharged the patient. Patient is not a risk to himself or others. I have discussed with the patient the following:  Physician has not determined patient is ready for discharge, Risks and consequences of leaving the hospital too soon and Benefit of continued hospitalization.      Discharge against medical advice form has been Patient refused to sign.

## 2019-12-16 NOTE — ED NOTES
Pt ambulated to the bathroom with some difficulty, not stable on his feet at this time. Gave water and starting ns bolus, will reassess

## 2019-12-16 NOTE — ED PROVIDER NOTES
Sign Out Note    Patient signed out to me by Dr Guy to follow-up vital signs after IV fluids in the setting of alcohol intoxication possible early withdrawal    10:16 PM  Patient finishes IV fluids remains tachycardic is he was score is 18 is feeling nauseated and is very unsteady on his feet will be admitted to the hospital for alcohol withdrawal.  I wrote for 2 more milligrams of Ativan as well as Zofran    10:37 PM  I spoke with the hospitalist Dr. Ingram for hospitalization and he has accepted the patient      1. Alcohol withdrawal  2. BRIDGER

## 2019-12-16 NOTE — ASSESSMENT & PLAN NOTE
Patient will be admitted to the telemetry unit with close cardiac monitoring on CIWA protocol  Started on rally bag, multivitamin, thiamine and folate  Replete electrolytes  Patient has been counseled on alcohol cessation and will be provided with information for outpatient detox facilities

## 2019-12-16 NOTE — ASSESSMENT & PLAN NOTE
Likely prerenal  IV fluid hydration with LR  Monitor BMP and assess response  Avoid IV contrast/nephrotoxins/NSAIDs  Dose adjust meds for decreased GFR

## 2019-12-16 NOTE — ASSESSMENT & PLAN NOTE
Rule out ACS  Initial EKG and troponin negative for ischemia  Continuous cardiac monitoring with serial EKG and troponin  Nuclear medicine cardiac stress test in the morning if troponin remains negative  Patient has been given full dose of aspirin  Check lipid panel, TSH and hemoglobin A1c  Nitro when necessary for chest pain

## 2019-12-16 NOTE — PROGRESS NOTES
2 RN skin check complete.   Devices in place NA.  Skin assessed under devices NA.  Confirmed pressure ulcers found on NA.  New potential pressure ulcers noted on NA. Wound consult placed NA.  The following interventions in place Pt encouraged to turn self side to side    Bruising on abdomen  Heels pink and blanching.

## 2019-12-16 NOTE — DISCHARGE INSTRUCTIONS
Discharge Instructions    Discharged to home by taxi with self. Discharged via wheelchair, hospital escort: Yes.  Special equipment needed: Not Applicable    Be sure to schedule a follow-up appointment with your primary care doctor or any specialists as instructed.     Discharge Plan:   Diet Plan: Discussed  Activity Level: Discussed  Smoking Cessation Offered: Patient Refused  Confirmed Follow up Appointment: Patient to Call and Schedule Appointment  Confirmed Symptoms Management: Discussed  Medication Reconciliation Updated: Yes  Influenza Vaccine Indication: Patient Refuses    I understand that a diet low in cholesterol, fat, and sodium is recommended for good health. Unless I have been given specific instructions below for another diet, I accept this instruction as my diet prescription.   Other diet: Cardiac, Diabetic     Special Instructions: None    · Is patient discharged on Warfarin / Coumadin?   No       Nonspecific Chest Pain  Chest pain can be caused by many different conditions. There is always a chance that your pain could be related to something serious, such as a heart attack or a blood clot in your lungs. Chest pain can also be caused by conditions that are not life-threatening. If you have chest pain, it is very important to follow up with your health care provider.  What are the causes?  Causes of this condition include:  · Heartburn.  · Pneumonia or bronchitis.  · Anxiety or stress.  · Inflammation around your heart (pericarditis) or lung (pleuritis or pleurisy).  · A blood clot in your lung.  · A collapsed lung (pneumothorax). This can develop suddenly on its own (spontaneous pneumothorax) or from trauma to the chest.  · Shingles infection (varicella-zoster virus).  · Heart attack.  · Damage to the bones, muscles, and cartilage that make up your chest wall. This can include:  ¨ Bruised bones due to injury.  ¨ Strained muscles or cartilage due to frequent or repeated coughing or  overwork.  ¨ Fracture to one or more ribs.  ¨ Sore cartilage due to inflammation (costochondritis).  What increases the risk?  Risk factors for this condition may include:  · Activities that increase your risk for trauma or injury to your chest.  · Respiratory infections or conditions that cause frequent coughing.  · Medical conditions or overeating that can cause heartburn.  · Heart disease or family history of heart disease.  · Conditions or health behaviors that increase your risk of developing a blood clot.  · Having had chicken pox (varicella zoster).  What are the signs or symptoms?  Chest pain can feel like:  · Burning or tingling on the surface of your chest or deep in your chest.  · Crushing, pressure, aching, or squeezing pain.  · Dull or sharp pain that is worse when you move, cough, or take a deep breath.  · Pain that is also felt in your back, neck, shoulder, or arm, or pain that spreads to any of these areas.  Your chest pain may come and go, or it may stay constant.  How is this diagnosed?  Lab tests or other studies may be needed to find the cause of your pain. Your health care provider may have you take a test called an ECG (electrocardiogram). An ECG records your heartbeat patterns at the time the test is performed. You may also have other tests, such as:  · Transthoracic echocardiogram (TTE). In this test, sound waves are used to create a picture of the heart structures and to look at how blood flows through your heart.  · Transesophageal echocardiogram (ALMA ROSA). This is a more advanced imaging test that takes images from inside your body. It allows your health care provider to see your heart in finer detail.  · Cardiac monitoring. This allows your health care provider to monitor your heart rate and rhythm in real time.  · Holter monitor. This is a portable device that records your heartbeat and can help to diagnose abnormal heartbeats. It allows your health care provider to track your heart activity  for several days, if needed.  · Stress tests. These can be done through exercise or by taking medicine that makes your heart beat more quickly.  · Blood tests.  · Other imaging tests.  How is this treated?  Treatment depends on what is causing your chest pain. Treatment may include:  · Medicines. These may include:  ¨ Acid blockers for heartburn.  ¨ Anti-inflammatory medicine.  ¨ Pain medicine for inflammatory conditions.  ¨ Antibiotic medicine, if an infection is present.  ¨ Medicines to dissolve blood clots.  ¨ Medicines to treat coronary artery disease (CAD).  · Supportive care for conditions that do not require medicines. This may include:  ¨ Resting.  ¨ Applying heat or cold packs to injured areas.  ¨ Limiting activities until pain decreases.  Follow these instructions at home:  Medicines  · If you were prescribed an antibiotic, take it as told by your health care provider. Do not stop taking the antibiotic even if you start to feel better.  · Take over-the-counter and prescription medicines only as told by your health care provider.  Lifestyle  · Do not use any products that contain nicotine or tobacco, such as cigarettes and e-cigarettes. If you need help quitting, ask your health care provider.  · Do not drink alcohol.  · Make lifestyle changes as directed by your health care provider. These may include:  ¨ Getting regular exercise. Ask your health care provider to suggest some activities that are safe for you.  ¨ Eating a heart-healthy diet. A registered dietitian can help you to learn healthy eating options.  ¨ Maintaining a healthy weight.  ¨ Managing diabetes, if necessary.  ¨ Reducing stress, such as with yoga or relaxation techniques.  General instructions  · Avoid any activities that bring on chest pain.  · If heartburn is the cause for your chest pain, raise (elevate) the head of your bed about 6 inches (15 cm) by putting blocks under the legs. Sleeping with more pillows does not effectively relieve  heartburn because it only changes the position of your head.  · Keep all follow-up visits as told by your health care provider. This is important. This includes any further testing if your chest pain does not go away.  Contact a health care provider if:  · Your chest pain does not go away.  · You have a rash with blisters on your chest.  · You have a fever.  · You have chills.  Get help right away if:  · Your chest pain is worse.  · You have a cough that gets worse, or you cough up blood.  · You have severe pain in your abdomen.  · You have severe weakness.  · You faint.  · You have sudden, unexplained chest discomfort.  · You have sudden, unexplained discomfort in your arms, back, neck, or jaw.  · You have shortness of breath at any time.  · You suddenly start to sweat, or your skin gets clammy.  · You feel nauseous or you vomit.  · You suddenly feel light-headed or dizzy.  · Your heart begins to beat quickly, or it feels like it is skipping beats.  These symptoms may represent a serious problem that is an emergency. Do not wait to see if the symptoms will go away. Get medical help right away. Call your local emergency services (911 in the [place] Do not drive yourself to the hospital.   This information is not intended to replace advice given to you by your health care provider. Make sure you discuss any questions you have with your health care provider.  Document Released: 09/27/2006 Document Revised: 09/11/2017 Document Reviewed: 09/11/2017  [employer] Patient Education © 2017 [employer]      Depression / Suicide Risk    As you are discharged from this [facility] facility, it is important to learn how to keep safe from harming yourself.    Recognize the warning signs:  · Abrupt changes in personality, positive or negative- including increase in energy   · Giving away possessions  · Change in eating patterns- significant weight changes-  positive or negative  · Change in sleeping patterns- unable  to sleep or sleeping all the time   · Unwillingness or inability to communicate  · Depression  · Unusual sadness, discouragement and loneliness  · Talk of wanting to die  · Neglect of personal appearance   · Rebelliousness- reckless behavior  · Withdrawal from people/activities they love  · Confusion- inability to concentrate     If you or a loved one observes any of these behaviors or has concerns about self-harm, here's what you can do:  · Talk about it- your feelings and reasons for harming yourself  · Remove any means that you might use to hurt yourself (examples: pills, rope, extension cords, firearm)  · Get professional help from the community (Mental Health, Substance Abuse, psychological counseling)  · Do not be alone:Call your Safe Contact- someone whom you trust who will be there for you.  · Call your local CRISIS HOTLINE 247-8709 or 507-671-6191  · Call your local Children's Mobile Crisis Response Team [place] (768) 425-3831 or www.[employer]  · Call the toll free National Suicide Prevention Hotlines   · National Suicide Prevention Lifeline 320-190-KLTA (0734)  · National Hope Line Network 800-SUICIDE (109-7941)

## 2019-12-16 NOTE — CARE PLAN
Problem: Communication  Goal: The ability to communicate needs accurately and effectively will improve  Outcome: PROGRESSING AS EXPECTED  Note:   Discussed POC and medications with patient. Pt verbalized understanding.      Problem: Safety  Goal: Will remain free from injury  Outcome: PROGRESSING AS EXPECTED  Note:   Bed locked and in lowest position. Bed alarm on. Treaded socks provided. Call light and belongings within reach.  Patient educated to call for assistance. Pt verbalized understanding. Hourly rounding in place.

## 2019-12-16 NOTE — ED NOTES
"Patient stated to this RN, \"I wanted to know if the doctor can either admit me or send me home? My uncle at home might be sleeping and he might not let me back home. I also don't have a ride or anyone I can call. I usually take the bus\". Dr. Guy made aware. Dr. Guy also went to room to talk with patient about not being admitted at this time. Patient was medicated per MAR and will receiving rest of 2nd bolus of NS. Patient was able to ambulate with steady gait around hallway.  "

## 2019-12-16 NOTE — ASSESSMENT & PLAN NOTE
Uncontrolled  Continue amlodipine  Hold Cozaar due to BRIDGER  IV labetalol PRN for SBP greater than 160

## 2019-12-16 NOTE — CARE PLAN
Problem: Safety  Goal: Will remain free from injury  Outcome: PROGRESSING AS EXPECTED  Note:   Bed locked and in lowest position.  Bed alarm on.  Treaded socks.  Call light and belongings with in reach.  Pt educated to call for assistance. Pt verbalized understanding.  Hourly rounding in place.        Problem: Knowledge Deficit  Goal: Knowledge of disease process/condition, treatment plan, diagnostic tests, and medications will improve  Outcome: PROGRESSING AS EXPECTED  Note:   Discussed POC and medications with patient.  Patient verbalized understanding.

## 2019-12-16 NOTE — H&P
Hospital Medicine History & Physical Note    Date of Service  12/15/2019    Primary Care Physician  Pcp Pt States None    Consultants  None    Code Status  Full code    Chief Complaint  Chest pain    History of Presenting Illness  54 y.o. male with a past medical history of alcohol abuse, tobacco abuse, type 2 diabetes mellitus, hypertension who presented 12/15/2019 with chest pain that started yesterday.  The patient reports left-sided chest pain which is pressure-like without radiation.  He denies any relieving or exacerbating factors.  He reports some associated shortness of breath.  He reports a dry cough.  He denies any fevers, chills, nausea or diaphoresis.  He states he has been drinking heavily and his last drink was yesterday.  He has developed symptoms of alcohol withdrawal with tremors, anxiety and agitation.    EKG interpreted by me reveals sinus tachycardia with no ST elevation or ST depression  Chest x-ray interpreted by me reveals no acute cardiopulmonary process    Review of Systems  Review of Systems   Constitutional: Positive for malaise/fatigue. Negative for chills, diaphoresis and fever.   HENT: Negative for hearing loss and sore throat.    Eyes: Negative for blurred vision.   Respiratory: Positive for cough and shortness of breath. Negative for sputum production and wheezing.    Cardiovascular: Positive for chest pain. Negative for palpitations and leg swelling.   Gastrointestinal: Negative for abdominal pain, blood in stool, diarrhea, nausea and vomiting.   Genitourinary: Negative for dysuria, flank pain and urgency.   Musculoskeletal: Negative for back pain, joint pain, myalgias and neck pain.   Skin: Negative for rash.   Neurological: Negative for dizziness, focal weakness, seizures and headaches.   Endo/Heme/Allergies: Does not bruise/bleed easily.   Psychiatric/Behavioral: Positive for substance abuse. Negative for suicidal ideas. The patient is nervous/anxious.    All other systems  reviewed and are negative.      Past Medical History   has a past medical history of Diabetes (HCC).    Surgical History  No pertinent surgical history    Family History  family history includes Heart Disease in his father.     Social History   reports that he has been smoking cigarettes. He started smoking about 24 years ago. He has been smoking about 0.00 packs per day for the past 25.00 years. He has never used smokeless tobacco. He reports current alcohol use. He reports previous drug use.    Allergies  No Known Allergies    Medications  Prior to Admission Medications   Prescriptions Last Dose Informant Patient Reported? Taking?   amLODIPine (NORVASC) 10 MG Tab 12/14/2019 at 0930 Patient Yes Yes   Sig: Take 10 mg by mouth every day.   glipiZIDE (GLUCOTROL) 5 MG Tab 12/14/2019 at 1730 Patient Yes Yes   Sig: Take 5 mg by mouth 2 times a day.   losartan (COZAAR) 100 MG Tab 12/14/2019 at 0930 Patient Yes Yes   Sig: Take 100 mg by mouth every day.   metformin (GLUCOPHAGE) 1000 MG tablet 12/14/2019 at 1730 Patient Yes Yes   Sig: Take 1,000 mg by mouth 2 times a day, with meals.   multivitamin (THERAGRAN) Tab 12/14/2019 at 0930 Patient Yes No   Sig: Take 1 Tab by mouth every day.   omeprazole (PRILOSEC) 20 MG delayed-release capsule 12/14/2019 at 0930 Patient Yes Yes   Sig: Take 20 mg by mouth every day.      Facility-Administered Medications: None       Physical Exam  Temp:  [37.1 °C (98.8 °F)] 37.1 °C (98.8 °F)  Pulse:  [] 117  Resp:  [15-17] 16  BP: (141-170)/(74-98) 170/88  SpO2:  [93 %-98 %] 93 %    Physical Exam  Vitals signs and nursing note reviewed.   Constitutional:       General: He is not in acute distress.     Appearance: Normal appearance.   HENT:      Head: Normocephalic and atraumatic.      Nose: Nose normal.      Mouth/Throat:      Mouth: Mucous membranes are dry.   Eyes:      Extraocular Movements: Extraocular movements intact.      Conjunctiva/sclera: Conjunctivae normal.      Pupils: Pupils  are equal, round, and reactive to light.   Neck:      Musculoskeletal: Normal range of motion and neck supple.   Cardiovascular:      Rate and Rhythm: Regular rhythm. Tachycardia present.      Pulses: Normal pulses.      Heart sounds: Normal heart sounds.   Pulmonary:      Effort: Pulmonary effort is normal. No respiratory distress.      Breath sounds: Normal breath sounds. No wheezing, rhonchi or rales.   Abdominal:      General: Bowel sounds are normal. There is no distension.      Palpations: Abdomen is soft.      Tenderness: There is no tenderness.   Musculoskeletal: Normal range of motion.         General: No swelling or tenderness.   Lymphadenopathy:      Cervical: No cervical adenopathy.   Skin:     General: Skin is warm.      Coloration: Skin is not jaundiced.      Findings: No rash.   Neurological:      General: No focal deficit present.      Mental Status: He is alert and oriented to person, place, and time.      Cranial Nerves: No cranial nerve deficit.      Motor: No weakness.      Comments: Tremulous  Strength and sensation intact bilaterally   Psychiatric:         Mood and Affect: Mood is anxious.         Behavior: Behavior normal.         Laboratory:  Recent Labs     12/15/19  1340   WBC 4.8   RBC 3.98*   HEMOGLOBIN 13.4*   HEMATOCRIT 38.8*   MCV 97.5   MCH 33.7*   MCHC 34.5   RDW 42.4   PLATELETCT 427   MPV 8.5*     Recent Labs     12/15/19  1340   SODIUM 143   POTASSIUM 4.4   CHLORIDE 109   CO2 20   GLUCOSE 131*   BUN 26*   CREATININE 1.57*   CALCIUM 9.2     Recent Labs     12/15/19  1340   ALTSGPT 108*   ASTSGOT 117*   ALKPHOSPHAT 89   TBILIRUBIN 0.5   LIPASE 47   GLUCOSE 131*         No results for input(s): NTPROBNP in the last 72 hours.      Recent Labs     12/15/19  1340   TROPONINT 16       Urinalysis:    No results found     Imaging:  DX-CHEST-PORTABLE (1 VIEW)   Final Result         No acute cardiac or pulmonary abnormality is identified.      NM-CARDIAC STRESS TEST    (Results Pending)          Assessment/Plan:  I anticipate this patient will require at least two midnights for appropriate medical management, necessitating inpatient admission.    Alcohol dependence with uncomplicated withdrawal (HCC)- (present on admission)  Assessment & Plan  Patient will be admitted to the telemetry unit with close cardiac monitoring on CIWA protocol  Started on rally bag, multivitamin, thiamine and folate  Replete electrolytes  Patient has been counseled on alcohol cessation and will be provided with information for outpatient detox facilities      Chest pain- (present on admission)  Assessment & Plan  Rule out ACS  Initial EKG and troponin negative for ischemia  Continuous cardiac monitoring with serial EKG and troponin  Nuclear medicine cardiac stress test in the morning if troponin remains negative  Patient has been given full dose of aspirin  Check lipid panel, TSH and hemoglobin A1c  Nitro when necessary for chest pain      BRIDGER (acute kidney injury) (HCC)- (present on admission)  Assessment & Plan  Likely prerenal  IV fluid hydration with LR  Monitor BMP and assess response  Avoid IV contrast/nephrotoxins/NSAIDs  Dose adjust meds for decreased GFR      Essential hypertension- (present on admission)  Assessment & Plan  Uncontrolled  Continue amlodipine  Hold Cozaar due to BRIDGER  IV labetalol PRN for SBP greater than 160    Type 2 diabetes mellitus without complication, without long-term current use of insulin (HCC)- (present on admission)  Assessment & Plan  Start on insulin sliding scale with serial Accu-Checks  Check hemoglobin A1c  Hypoglycemic protocol in place        Tobacco abuse- (present on admission)  Assessment & Plan  Tobacco cessation education provided for more than 11 minutes.  We discussed the risks of smoking including increased risk of heart disease, stroke, cancer and COPD. We discussed the benefits of quitting smoking. We discussed options of nicotine patch, wellbutrin and chantix.  The  patient has the intention to quit smoking. Nicotine replacement protocol will be provided to the patient.        VTE prophylaxis: Heparin

## 2019-12-16 NOTE — ASSESSMENT & PLAN NOTE
Tobacco cessation education provided for more than 11 minutes.  We discussed the risks of smoking including increased risk of heart disease, stroke, cancer and COPD. We discussed the benefits of quitting smoking. We discussed options of nicotine patch, wellbutrin and chantix.  The patient has the intention to quit smoking. Nicotine replacement protocol will be provided to the patient.

## 2019-12-17 NOTE — DOCUMENTATION QUERY
"                                                                         [facility]                                                                       Query Response Note      PATIENT:               DIAMOND LINK  ACCT #:                  [id number]  MRN:                     [id number]  :                      1965  ADMIT DATE:       2019 3:38 PM  DISCH DATE:        2019 3:30 PM  RESPONDING  PROVIDER #:        [id number]           QUERY TEXT:    Alcohol withdrawal is documented in the Medical Record.      Coding of alcohol withdrawal defaults to alcohol dependence. Coding Clinic instructs coders to query the physician for clarification when only alcohol abuse and withdrawal are both documented in the chart. Based on clinical findings, risk factors and treatment, please clarify the pattern of the alcohol use.      NOTE:  If an appropriate response is not listed below, please respond with a new note.        The patient's Clinical Indicators include:  Patient is a 55 y/o male presenting chest pain, found to be intoxicated.  On admission patient was actively withdrawing, CIWA protocol was initiated and patient received a banana bag.  BAL was 0.29.    Progress notes indicate patient was drinking an \"excessive\" amount of alcohol.  Options provided:   -- Alcohol dependence with withdrawal   -- Alcohol abuse only   -- Unable to determine      Query created by: Megan Deleon on 2019 4:45 PM    RESPONSE TEXT:    Alcohol dependence with withdrawal          Electronically signed by:  SHANA HINKLE 2019 3:29 PM              "

## 2019-12-23 ENCOUNTER — HOSPITAL ENCOUNTER (EMERGENCY)
Dept: HOSPITAL 8 - ED | Age: 54
Discharge: HOME | End: 2019-12-23
Payer: COMMERCIAL

## 2019-12-23 VITALS — HEIGHT: 66 IN | WEIGHT: 169.76 LBS | BODY MASS INDEX: 27.28 KG/M2

## 2019-12-23 VITALS — SYSTOLIC BLOOD PRESSURE: 160 MMHG | DIASTOLIC BLOOD PRESSURE: 83 MMHG

## 2019-12-23 DIAGNOSIS — I10: ICD-10-CM

## 2019-12-23 DIAGNOSIS — F10.220: ICD-10-CM

## 2019-12-23 DIAGNOSIS — R42: Primary | ICD-10-CM

## 2019-12-23 DIAGNOSIS — E11.9: ICD-10-CM

## 2019-12-23 LAB
ALBUMIN SERPL-MCNC: 3.2 G/DL (ref 3.4–5)
ALP SERPL-CCNC: 116 U/L (ref 45–117)
ALT SERPL-CCNC: 112 U/L (ref 12–78)
ANION GAP SERPL CALC-SCNC: 16 MMOL/L (ref 5–15)
BASOPHILS # BLD AUTO: 0.04 X10^3/UL (ref 0–0.1)
BASOPHILS NFR BLD AUTO: 1 % (ref 0–1)
BILIRUB SERPL-MCNC: 0.7 MG/DL (ref 0.2–1)
CALCIUM SERPL-MCNC: 8.1 MG/DL (ref 8.5–10.1)
CHLORIDE SERPL-SCNC: 103 MMOL/L (ref 98–107)
CREAT SERPL-MCNC: 1.14 MG/DL (ref 0.7–1.3)
EOSINOPHIL # BLD AUTO: 0.2 X10^3/UL (ref 0–0.4)
EOSINOPHIL NFR BLD AUTO: 4 % (ref 1–7)
ERYTHROCYTE [DISTWIDTH] IN BLOOD BY AUTOMATED COUNT: 12.3 % (ref 9.4–14.8)
LYMPHOCYTES # BLD AUTO: 1.32 X10^3/UL (ref 1–3.4)
LYMPHOCYTES NFR BLD AUTO: 28 % (ref 22–44)
MCH RBC QN AUTO: 33.7 PG (ref 27.5–34.5)
MCHC RBC AUTO-ENTMCNC: 34.1 G/DL (ref 33.2–36.2)
MCV RBC AUTO: 99.1 FL (ref 81–97)
MD: NO
MONOCYTES # BLD AUTO: 0.63 X10^3/UL (ref 0.2–0.8)
MONOCYTES NFR BLD AUTO: 13 % (ref 2–9)
NEUTROPHILS # BLD AUTO: 2.5 X10^3/UL (ref 1.8–6.8)
NEUTROPHILS NFR BLD AUTO: 53 % (ref 42–75)
PLATELET # BLD AUTO: 173 X10^3/UL (ref 130–400)
PMV BLD AUTO: 6.9 FL (ref 7.4–10.4)
PROT SERPL-MCNC: 7.3 G/DL (ref 6.4–8.2)
RBC # BLD AUTO: 3.73 X10^6/UL (ref 4.38–5.82)
TROPONIN I SERPL-MCNC: < 0.015 NG/ML (ref 0–0.04)

## 2019-12-23 PROCEDURE — 96375 TX/PRO/DX INJ NEW DRUG ADDON: CPT

## 2019-12-23 PROCEDURE — 96361 HYDRATE IV INFUSION ADD-ON: CPT

## 2019-12-23 PROCEDURE — 80053 COMPREHEN METABOLIC PANEL: CPT

## 2019-12-23 PROCEDURE — 99284 EMERGENCY DEPT VISIT MOD MDM: CPT

## 2019-12-23 PROCEDURE — 80307 DRUG TEST PRSMV CHEM ANLYZR: CPT

## 2019-12-23 PROCEDURE — 84484 ASSAY OF TROPONIN QUANT: CPT

## 2019-12-23 PROCEDURE — 93005 ELECTROCARDIOGRAM TRACING: CPT

## 2019-12-23 PROCEDURE — 96374 THER/PROPH/DIAG INJ IV PUSH: CPT

## 2019-12-23 PROCEDURE — 36415 COLL VENOUS BLD VENIPUNCTURE: CPT

## 2019-12-23 PROCEDURE — 85025 COMPLETE CBC W/AUTO DIFF WBC: CPT

## 2019-12-23 NOTE — NUR
THIS IS A 55 YO MALE BIB [facility] FOR LIGHTHEADED/DIZZINESS WITH NAUSEA 
INTERMITTENTLY THROUGHOUT THE DAY, PATIENT IS POSITIVE ETOH, STATES HE DRANK 3 
SHOTS TODAY, STATES "I USUALLY DRINK A LOT BUT I FELT DIZZY TODAY". PATIENT HAS 
HX HTN AND T2DM, NONCOMPLIANT WITH MEDICATIONS, RECENTLY STARTED ON AMLODINPINE 
AND GLIPIZIDE AFTER BEING SEEN AT [facility] FOR SAME SYMPTOMS 4 WEEKS AGO. PATIENT 
A&OX4, NAD, PATIENT IS HYPERTENSIVE OTHERWISE VSS. PLACED ON CARDIAC MONITOR, 
NSR NOTED, CONTINUOUS SPO2 AT 98%, CYCLE BP Q1HR. CALL LIGHT IN REACH, DENIES 
NEEDS AT THIS TIME.

## 2019-12-31 ENCOUNTER — APPOINTMENT (OUTPATIENT)
Dept: RADIOLOGY | Facility: MEDICAL CENTER | Age: 54
End: 2019-12-31
Attending: EMERGENCY MEDICINE

## 2019-12-31 ENCOUNTER — HOSPITAL ENCOUNTER (EMERGENCY)
Dept: HOSPITAL 8 - ED | Age: 54
Discharge: HOME | End: 2019-12-31
Payer: COMMERCIAL

## 2019-12-31 ENCOUNTER — HOSPITAL ENCOUNTER (EMERGENCY)
Facility: MEDICAL CENTER | Age: 54
End: 2020-01-01
Attending: EMERGENCY MEDICINE

## 2019-12-31 VITALS — BODY MASS INDEX: 27.46 KG/M2 | HEIGHT: 70 IN | WEIGHT: 191.8 LBS

## 2019-12-31 VITALS — SYSTOLIC BLOOD PRESSURE: 145 MMHG | DIASTOLIC BLOOD PRESSURE: 86 MMHG

## 2019-12-31 DIAGNOSIS — F10.120: Primary | ICD-10-CM
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
## 2019-12-31 DIAGNOSIS — R07.9 CHEST PAIN, UNSPECIFIED TYPE: ICD-10-CM

## 2019-12-31 DIAGNOSIS — G31.2: ICD-10-CM

## 2019-12-31 DIAGNOSIS — R73.9 HYPERGLYCEMIA: ICD-10-CM

## 2019-12-31 DIAGNOSIS — I10: ICD-10-CM

## 2019-12-31 DIAGNOSIS — E11.9: ICD-10-CM

## 2019-12-31 DIAGNOSIS — F10.920 ALCOHOLIC INTOXICATION WITHOUT COMPLICATION (HCC): ICD-10-CM

## 2019-12-31 LAB
ALBUMIN SERPL BCP-MCNC: 3.6 G/DL (ref 3.2–4.9)
ALBUMIN SERPL-MCNC: 3 G/DL (ref 3.4–5)
ALBUMIN/GLOB SERPL: 1.1 G/DL
ALP SERPL-CCNC: 134 U/L (ref 30–99)
ALP SERPL-CCNC: 143 U/L (ref 45–117)
ALT SERPL-CCNC: 102 U/L (ref 2–50)
ALT SERPL-CCNC: 134 U/L (ref 12–78)
ANION GAP SERPL CALC-SCNC: 14 MMOL/L (ref 5–15)
ANION GAP SERPL CALC-SCNC: 18 MMOL/L (ref 0–11.9)
AST SERPL-CCNC: 172 U/L (ref 12–45)
BASOPHILS # BLD AUTO: 0.08 X10^3/UL (ref 0–0.1)
BASOPHILS # BLD AUTO: 1.8 % (ref 0–1.8)
BASOPHILS # BLD: 0.08 K/UL (ref 0–0.12)
BASOPHILS NFR BLD AUTO: 2 % (ref 0–1)
BILIRUB SERPL-MCNC: 0.5 MG/DL (ref 0.1–1.5)
BILIRUB SERPL-MCNC: 0.5 MG/DL (ref 0.2–1)
BUN SERPL-MCNC: 15 MG/DL (ref 8–22)
CALCIUM SERPL-MCNC: 8.1 MG/DL (ref 8.5–10.1)
CALCIUM SERPL-MCNC: 8.4 MG/DL (ref 8.5–10.5)
CHLORIDE SERPL-SCNC: 102 MMOL/L (ref 96–112)
CHLORIDE SERPL-SCNC: 104 MMOL/L (ref 98–107)
CO2 SERPL-SCNC: 20 MMOL/L (ref 20–33)
CREAT SERPL-MCNC: 1.29 MG/DL (ref 0.7–1.3)
CREAT SERPL-MCNC: 1.31 MG/DL (ref 0.5–1.4)
EKG IMPRESSION: NORMAL
EOSINOPHIL # BLD AUTO: 0.14 K/UL (ref 0–0.51)
EOSINOPHIL # BLD AUTO: 0.23 X10^3/UL (ref 0–0.4)
EOSINOPHIL NFR BLD AUTO: 5 % (ref 1–7)
EOSINOPHIL NFR BLD: 3.2 % (ref 0–6.9)
ERYTHROCYTE [DISTWIDTH] IN BLOOD BY AUTOMATED COUNT: 12.5 % (ref 9.4–14.8)
ERYTHROCYTE [DISTWIDTH] IN BLOOD BY AUTOMATED COUNT: 42.4 FL (ref 35.9–50)
ETHANOL BLD-MCNC: 0.32 G/DL
GLOBULIN SER CALC-MCNC: 3.4 G/DL (ref 1.9–3.5)
GLUCOSE SERPL-MCNC: 324 MG/DL (ref 65–99)
HCT VFR BLD AUTO: 34.4 % (ref 42–52)
HGB BLD-MCNC: 11.6 G/DL (ref 14–18)
IMM GRANULOCYTES # BLD AUTO: 0.01 K/UL (ref 0–0.11)
IMM GRANULOCYTES NFR BLD AUTO: 0.2 % (ref 0–0.9)
LIPASE SERPL-CCNC: 100 U/L (ref 11–82)
LYMPHOCYTES # BLD AUTO: 1.24 K/UL (ref 1–4.8)
LYMPHOCYTES # BLD AUTO: 1.57 X10^3/UL (ref 1–3.4)
LYMPHOCYTES NFR BLD AUTO: 34 % (ref 22–44)
LYMPHOCYTES NFR BLD: 28.3 % (ref 22–41)
MCH RBC QN AUTO: 33 PG (ref 27–33)
MCH RBC QN AUTO: 33.1 PG (ref 27.5–34.5)
MCHC RBC AUTO-ENTMCNC: 33.7 G/DL (ref 33.7–35.3)
MCHC RBC AUTO-ENTMCNC: 33.9 G/DL (ref 33.2–36.2)
MCV RBC AUTO: 97.6 FL (ref 81–97)
MCV RBC AUTO: 97.7 FL (ref 81.4–97.8)
MD: NO
MONOCYTES # BLD AUTO: 0.69 K/UL (ref 0–0.85)
MONOCYTES # BLD AUTO: 0.7 X10^3/UL (ref 0.2–0.8)
MONOCYTES NFR BLD AUTO: 15 % (ref 2–9)
MONOCYTES NFR BLD AUTO: 15.8 % (ref 0–13.4)
NEUTROPHILS # BLD AUTO: 2.08 X10^3/UL (ref 1.8–6.8)
NEUTROPHILS # BLD AUTO: 2.22 K/UL (ref 1.82–7.42)
NEUTROPHILS NFR BLD AUTO: 45 % (ref 42–75)
NEUTROPHILS NFR BLD: 50.7 % (ref 44–72)
NRBC # BLD AUTO: 0 K/UL
NRBC BLD-RTO: 0 /100 WBC
NT-PROBNP SERPL IA-MCNC: 16 PG/ML (ref 0–125)
PLATELET # BLD AUTO: 176 K/UL (ref 164–446)
PLATELET # BLD AUTO: 241 X10^3/UL (ref 130–400)
PMV BLD AUTO: 6.4 FL (ref 7.4–10.4)
PMV BLD AUTO: 8.8 FL (ref 9–12.9)
POTASSIUM SERPL-SCNC: 3.9 MMOL/L (ref 3.6–5.5)
PROT SERPL-MCNC: 7 G/DL (ref 6–8.2)
PROT SERPL-MCNC: 7.1 G/DL (ref 6.4–8.2)
RBC # BLD AUTO: 3.52 M/UL (ref 4.7–6.1)
RBC # BLD AUTO: 3.66 X10^6/UL (ref 4.38–5.82)
SODIUM SERPL-SCNC: 140 MMOL/L (ref 135–145)
TROPONIN I SERPL-MCNC: < 0.015 NG/ML (ref 0–0.04)
TROPONIN T SERPL-MCNC: 13 NG/L (ref 6–19)
WBC # BLD AUTO: 4.4 K/UL (ref 4.8–10.8)

## 2019-12-31 PROCEDURE — 99284 EMERGENCY DEPT VISIT MOD MDM: CPT

## 2019-12-31 PROCEDURE — 85025 COMPLETE CBC W/AUTO DIFF WBC: CPT

## 2019-12-31 PROCEDURE — 36415 COLL VENOUS BLD VENIPUNCTURE: CPT

## 2019-12-31 PROCEDURE — 99285 EMERGENCY DEPT VISIT HI MDM: CPT

## 2019-12-31 PROCEDURE — 84484 ASSAY OF TROPONIN QUANT: CPT

## 2019-12-31 PROCEDURE — 700102 HCHG RX REV CODE 250 W/ 637 OVERRIDE(OP): Performed by: EMERGENCY MEDICINE

## 2019-12-31 PROCEDURE — 80053 COMPREHEN METABOLIC PANEL: CPT

## 2019-12-31 PROCEDURE — 93005 ELECTROCARDIOGRAM TRACING: CPT

## 2019-12-31 PROCEDURE — 83690 ASSAY OF LIPASE: CPT

## 2019-12-31 PROCEDURE — 700105 HCHG RX REV CODE 258: Performed by: EMERGENCY MEDICINE

## 2019-12-31 PROCEDURE — 96374 THER/PROPH/DIAG INJ IV PUSH: CPT

## 2019-12-31 PROCEDURE — 700111 HCHG RX REV CODE 636 W/ 250 OVERRIDE (IP): Performed by: EMERGENCY MEDICINE

## 2019-12-31 PROCEDURE — 80307 DRUG TEST PRSMV CHEM ANLYZR: CPT

## 2019-12-31 PROCEDURE — A9270 NON-COVERED ITEM OR SERVICE: HCPCS | Performed by: EMERGENCY MEDICINE

## 2019-12-31 PROCEDURE — 83880 ASSAY OF NATRIURETIC PEPTIDE: CPT

## 2019-12-31 RX ORDER — ONDANSETRON 2 MG/ML
4 INJECTION INTRAMUSCULAR; INTRAVENOUS ONCE
Status: COMPLETED | OUTPATIENT
Start: 2019-12-31 | End: 2019-12-31

## 2019-12-31 RX ORDER — ACETAMINOPHEN 325 MG/1
650 TABLET ORAL ONCE
Status: COMPLETED | OUTPATIENT
Start: 2019-12-31 | End: 2019-12-31

## 2019-12-31 RX ORDER — SODIUM CHLORIDE 9 MG/ML
1000 INJECTION, SOLUTION INTRAVENOUS ONCE
Status: COMPLETED | OUTPATIENT
Start: 2019-12-31 | End: 2020-01-01

## 2019-12-31 RX ADMIN — SODIUM CHLORIDE 1000 ML: 9 INJECTION, SOLUTION INTRAVENOUS at 22:59

## 2019-12-31 RX ADMIN — ONDANSETRON 4 MG: 2 INJECTION INTRAMUSCULAR; INTRAVENOUS at 23:00

## 2019-12-31 RX ADMIN — ACETAMINOPHEN 650 MG: 325 TABLET, FILM COATED ORAL at 23:00

## 2019-12-31 ASSESSMENT — LIFESTYLE VARIABLES
TOTAL SCORE: 0
CONSUMPTION TOTAL: INCOMPLETE
EVER HAD A DRINK FIRST THING IN THE MORNING TO STEADY YOUR NERVES TO GET RID OF A HANGOVER: NO
HAVE PEOPLE ANNOYED YOU BY CRITICIZING YOUR DRINKING: NO
EVER FELT BAD OR GUILTY ABOUT YOUR DRINKING: NO
TOTAL SCORE: 0
TOTAL SCORE: 0
DO YOU DRINK ALCOHOL: YES
HAVE YOU EVER FELT YOU SHOULD CUT DOWN ON YOUR DRINKING: NO

## 2019-12-31 NOTE — NUR
PT RESTING ON GURNEY WITH EYES CLOSED. CALL LIGHT WITHIN REACH, ALL SAFETY 
MEASURES IN PLACE. PT CONNECTED TO ALL MONITORING.

## 2019-12-31 NOTE — NUR
PT. BIB [facility] THROUGH TRIAGE.  PT. WITH VERY STRONG ETOH ODOR.  STATES "I HAVE 
PAIN IN MY BELLY", I HAD THE SAME THING 2 WEEKS AGO.  PT. DENIES FREQUENT ETOH 
USE BUT ADMITS TO "SOME BEERS" TONIGHT.  PT. VERY DROWSY AND IS A POOR 
HISTORIAN.  ALL MONITORS PLACED.  ALL SAFETY MEASURES OBSRVED.

## 2019-12-31 NOTE — NUR
SECUTIRY TO BS TO ASSIST WITH PT. D/C AS PT. REFUSING TO GET OFF [facility] FOR 
D/C.  PT. WAS ABLE TO AMBULATE DOWN HICKS FROM ED 22 TO BR NEAR ED 26 AND BACK 
TO ED22 WITH STEADY GAIT.  PT. ESCORED TO D/C BY SECURITY.  ALL BELONGINGS WITH 
PT.

## 2019-12-31 NOTE — NUR
PT RESTING ON GURNEY WITH EYES CLOSED. RESPIRATIONS EVEN AND UNLABORED. PT 
CONNECTED TO MONITORING.

## 2020-01-01 VITALS
HEIGHT: 66 IN | RESPIRATION RATE: 18 BRPM | WEIGHT: 170 LBS | HEART RATE: 99 BPM | SYSTOLIC BLOOD PRESSURE: 166 MMHG | DIASTOLIC BLOOD PRESSURE: 81 MMHG | OXYGEN SATURATION: 94 % | BODY MASS INDEX: 27.32 KG/M2 | TEMPERATURE: 98.6 F

## 2020-01-01 LAB
AMPHET UR QL SCN: NEGATIVE
APPEARANCE UR: CLEAR
BACTERIA #/AREA URNS HPF: NEGATIVE /HPF
BARBITURATES UR QL SCN: NEGATIVE
BENZODIAZ UR QL SCN: NEGATIVE
BILIRUB UR QL STRIP.AUTO: NEGATIVE
BZE UR QL SCN: NEGATIVE
CANNABINOIDS UR QL SCN: NEGATIVE
COLOR UR: YELLOW
EPI CELLS #/AREA URNS HPF: NEGATIVE /HPF
GLUCOSE UR STRIP.AUTO-MCNC: >=1000 MG/DL
HYALINE CASTS #/AREA URNS LPF: ABNORMAL /LPF
KETONES UR STRIP.AUTO-MCNC: NEGATIVE MG/DL
LEUKOCYTE ESTERASE UR QL STRIP.AUTO: NEGATIVE
METHADONE UR QL SCN: NEGATIVE
MICRO URNS: ABNORMAL
NITRITE UR QL STRIP.AUTO: NEGATIVE
OPIATES UR QL SCN: NEGATIVE
OXYCODONE UR QL SCN: NEGATIVE
PCP UR QL SCN: NEGATIVE
PH UR STRIP.AUTO: 5.5 [PH] (ref 5–8)
PROPOXYPH UR QL SCN: NEGATIVE
PROT UR QL STRIP: 300 MG/DL
RBC # URNS HPF: ABNORMAL /HPF
RBC UR QL AUTO: ABNORMAL
SP GR UR STRIP.AUTO: 1.02
TROPONIN T SERPL-MCNC: 17 NG/L (ref 6–19)
UROBILINOGEN UR STRIP.AUTO-MCNC: 1 MG/DL
WBC #/AREA URNS HPF: ABNORMAL /HPF

## 2020-01-01 PROCEDURE — 99285 EMERGENCY DEPT VISIT HI MDM: CPT

## 2020-01-01 PROCEDURE — 71275 CT ANGIOGRAPHY CHEST: CPT

## 2020-01-01 PROCEDURE — 700117 HCHG RX CONTRAST REV CODE 255: Performed by: EMERGENCY MEDICINE

## 2020-01-01 PROCEDURE — 700105 HCHG RX REV CODE 258: Performed by: EMERGENCY MEDICINE

## 2020-01-01 PROCEDURE — 80307 DRUG TEST PRSMV CHEM ANLYZR: CPT

## 2020-01-01 PROCEDURE — 96374 THER/PROPH/DIAG INJ IV PUSH: CPT

## 2020-01-01 PROCEDURE — 81001 URINALYSIS AUTO W/SCOPE: CPT

## 2020-01-01 PROCEDURE — 84484 ASSAY OF TROPONIN QUANT: CPT

## 2020-01-01 RX ORDER — SODIUM CHLORIDE 9 MG/ML
1000 INJECTION, SOLUTION INTRAVENOUS ONCE
Status: COMPLETED | OUTPATIENT
Start: 2020-01-01 | End: 2020-01-01

## 2020-01-01 RX ORDER — SODIUM CHLORIDE, SODIUM LACTATE, POTASSIUM CHLORIDE, CALCIUM CHLORIDE 600; 310; 30; 20 MG/100ML; MG/100ML; MG/100ML; MG/100ML
1000 INJECTION, SOLUTION INTRAVENOUS ONCE
Status: COMPLETED | OUTPATIENT
Start: 2020-01-01 | End: 2020-01-01

## 2020-01-01 RX ADMIN — SODIUM CHLORIDE, POTASSIUM CHLORIDE, SODIUM LACTATE AND CALCIUM CHLORIDE 1000 ML: 600; 310; 30; 20 INJECTION, SOLUTION INTRAVENOUS at 04:20

## 2020-01-01 RX ADMIN — SODIUM CHLORIDE 1000 ML: 9 INJECTION, SOLUTION INTRAVENOUS at 01:18

## 2020-01-01 RX ADMIN — IOHEXOL 100 ML: 350 INJECTION, SOLUTION INTRAVENOUS at 00:36

## 2020-01-01 NOTE — ED PROVIDER NOTES
ED Provider Note    Chief Complaint:   Shortness of breath    HPI:  Thaddeus Adan is a 54 y.o. male who presents with chief complaint of shortness of breath.  He has a past medical history significant for diabetes, hypertension, as well as alcohol and tobacco use.  He reports shortness of breath for the past hour, with associated chest pain.  His chest pain is reproducible on palpation.  He reports no associated nausea or vomiting, does not describe diaphoresis.  He has had no recent fevers.  He denies any cough or congestion.  He states that at times his legs feel swollen, however they are not very swollen today.  He denies abdominal pain.  Shortness of breath is described as worse with exertion, he denies any history of asthma nor COPD, denies any current inhaler use.  He is unable to identify any alleviating factors.  States that he has been drinking today.    Review of Systems:  See HPI for pertinent positives and negatives. All other systems negative.    Past Medical History:   has a past medical history of Diabetes (HCC) and Hypertension.    Social History:  Social History     Tobacco Use   • Smoking status: Current Every Day Smoker     Packs/day: 0.00     Years: 25.00     Pack years: 0.00     Types: Cigarettes     Start date: 1995   • Smokeless tobacco: Never Used   Substance and Sexual Activity   • Alcohol use: Yes     Frequency: Patient refused     Drinks per session: Patient refused     Binge frequency: Patient refused   • Drug use: Not Currently   • Sexual activity: Not on file       Surgical History:  patient denies any surgical history    Current Medications:  Home Medications     Reviewed by Daniel Stark R.N. (Registered Nurse) on 12/31/19 at 2209  Med List Status: Partial   Medication Last Dose Status   amLODIPine (NORVASC) 10 MG Tab  Active   glipiZIDE (GLUCOTROL) 5 MG Tab  Active   losartan (COZAAR) 100 MG Tab 12/27/2019 Active   metformin (GLUCOPHAGE) 1000 MG tablet 12/27/2019 Active  "  multivitamin (THERAGRAN) Tab  Active   omeprazole (PRILOSEC) 20 MG delayed-release capsule  Active                Allergies:  No Known Allergies    Physical Exam:  Vital Signs: BP (!) 166/81   Pulse 99   Temp 37 °C (98.6 °F) (Temporal)   Resp 18   Ht 1.676 m (5' 6\")   Wt 77.1 kg (170 lb)   SpO2 94%   BMI 27.44 kg/m²   Constitutional: Awake, cooperative, though drowsy appearing  HENT: Dry mucous membranes, atraumatic, normocephalic  Eyes: Pupils equal and reactive, normal conjunctiva  Neck: Supple, normal range of motion, no stridor  Cardiovascular: Extremities are warm and well perfused, no murmur appreciated, normal cardiac auscultation  Pulmonary: No respiratory distress, normal work of breathing, no accessory muscule usage, breath sounds quiet and equal bilaterally  Abdomen: Soft, non-distended, non-tender to palpation, no peritoneal signs  Skin: Warm, dry, no rashes or lesions  Musculoskeletal: Normal range of motion in all extremities, trace lower extremity edema, lower extremities are symmetric, palpation of the anterior chest wall reproduces chest pain  Neurologic: Alert, oriented, normal speech, normal motor function  Psychiatric: Normal and appropriate mood and affect    Medical records reviewed for continuity of care.  Discharge summary reviewed from 12/16/2019.  Patient was admitted for chest pain.  He is noted to have a history of alcohol abuse, tobacco abuse, type 2 diabetes, as well as hypertension.  Troponin and EKG were benign.  CTA was ordered to rule out pulmonary embolism, however patient refused to have this done and wanted to leave AGAINST MEDICAL ADVICE.  He was discharged home AGAINST MEDICAL ADVICE.  Echocardiogram reviewed from 12/2/2019, left ventricular ejection fraction estimated to be 65%, mild left ventricular hypertrophy.    EKG: Rate 103, sinus tachycardia, no ST elevation or depression, no T wave inversions, T wave flattening in leads III, aVF, V1, no ectopy.    Labs:  Labs " Reviewed   CBC WITH DIFFERENTIAL - Abnormal; Notable for the following components:       Result Value    WBC 4.4 (*)     RBC 3.52 (*)     Hemoglobin 11.6 (*)     Hematocrit 34.4 (*)     MPV 8.8 (*)     Monocytes 15.80 (*)     All other components within normal limits   COMP METABOLIC PANEL - Abnormal; Notable for the following components:    Anion Gap 18.0 (*)     Glucose 324 (*)     Calcium 8.4 (*)     AST(SGOT) 172 (*)     ALT(SGPT) 102 (*)     Alkaline Phosphatase 134 (*)     All other components within normal limits   LIPASE - Abnormal; Notable for the following components:    Lipase 100 (*)     All other components within normal limits   DIAGNOSTIC ALCOHOL - Abnormal; Notable for the following components:    Diagnostic Alcohol 0.32 (*)     All other components within normal limits   ESTIMATED GFR - Abnormal; Notable for the following components:    GFR If Non  57 (*)     All other components within normal limits   URINALYSIS,CULTURE IF INDICATED - Abnormal; Notable for the following components:    Glucose >=1000 (*)     Protein 300 (*)     Occult Blood Small (*)     All other components within normal limits   URINE MICROSCOPIC (W/UA) - Abnormal; Notable for the following components:    WBC 0-2 (*)     RBC 2-5 (*)     All other components within normal limits   PROBRAIN NATRIURETIC PEPTIDE, NT   TROPONIN   TROPONIN   URINE DRUG SCREEN       Radiology:  CT-CTA CHEST PULMONARY ARTERY W/ RECONS   Final Result      1.  No CT evidence of pulmonary embolism.      2.  Minimal opacifications posteriorly in each lung base could be due to discoid atelectasis.      3.  Decreased liver attenuation is consistent with hepatic steatosis.                 ED Medications Administered:  Medications   acetaminophen (TYLENOL) tablet 650 mg (650 mg Oral Given 12/31/19 2300)   NS infusion 1,000 mL (0 mL Intravenous Stopped 1/1/20 0105)   ondansetron (ZOFRAN) syringe/vial injection 4 mg (4 mg Intravenous Given 12/31/19  2300)   iohexol (OMNIPAQUE) 350 mg/mL (100 mL Intravenous Given 1/1/20 0036)   NS infusion 1,000 mL (0 mL Intravenous Stopped 1/1/20 0256)   lactated ringers infusion (BOLUS) (0 mL Intravenous Stopped 1/1/20 7815)       Differential diagnosis:  Musculoskeletal chest pain, pneumonia, pulmonary embolism, ACS, pancreatitis    MDM:  Patient presents with chief complaint of chest pain and shortness of breath.  On arrival to the emergency department he is mildly tachycardic at rest with heart rate between 100-110.  His EKG shows no evidence of acute ischemia.  On review of prior medical records, CTA was recommended during his most recent hospital admission due to a positive d-dimer in the setting of shortness of breath, however he left AGAINST MEDICAL ADVICE before this test could be performed.  Pulmonary embolism risk factors now include recent hospital admission.  CTA ordered for further evaluation.  This is negative for pulmonary embolism.  Based on physical exam, I suspect this is musculoskeletal chest pain as it is entirely reproducible.    Due to tachycardia at rest with associated nausea IV fluid bolus was administered.  Rapid rehydration necessary for his vital sign abnormalities.  Oral fluid challenges and appropriate as patient is not currently able to tolerate oral fluids.  On reassessment after receiving IV fluids heart rate normalized.    On laboratory evaluation liver enzymes are mildly elevated, this is consistent with his history of heavy drinking.  Lipase is elevated to 100, patient has no active vomiting, is able to tolerate oral fluids after receiving Zofran in the emergency department, do not believe he requires admission nor advanced imaging for acute pancreatitis.  Total bilirubin is within normal limits, less concerning for biliary obstruction.  High-sensitivity troponin and proBNP are within normal limits, doubt cardiac etiology.  Hemoglobin is low at 11.6, patient has no evidence of acute blood  loss.  Counseled to follow-up with his primary care physician for hemoglobin recheck within 1 week. Urinalysis is positive for glucose, consistent with his history of poorly controlled diabetes.   Hepatic steatosis noted on CT of the chest, patient is referred to gastroenterology for follow-up.    Plan at this time is for discharge home.  Patient was observed in the emergency department until clinically sober, and provided with IV fluid rehydration.  Suspect he is dehydrated due to hyperglycemia.  It is noted that he is not fully compliant with his diabetic medications, and that insulin has been recommended, however he declines this intervention.  He will call his primary care physician at the opening of business hours to review his emergency department visit today and adjust his medications if needed. Return precautions were discussed with the patient, and provided in written form with the patient's discharge instructions.     Blood pressure today is greater than 120/80, patient is instructed to follow up with primary care provider for blood pressure recheck.    Disposition:  6:31 AM      Final Impression:  1. Hyperglycemia    2. Alcoholic intoxication without complication (HCC)    3. Chest pain, unspecified type        Electronically signed by: Araseli Tang MD, 1/2/2019 6:31 AM

## 2020-01-01 NOTE — ED TRIAGE NOTES
"Report from Abigail with [facility]/ [facility]: patient in with epigastric pain that is reproducible on palpation.  Unclear onset/ history of present illness, but patient was seen at [facility] in the last 24 hours with similar symptoms - he says they \"didn't do anything for it.\"      Patient presented with BGL of 325 and odor of alcohol on his breath.  Was given Zofran and 250ml NS en route.    GCS 15, RR 24, /110 and then 161/94.  20G right AC.      Patient reports onset of chest pain/ shortness fo breath 45 min ago.  Says he was seen yesterday for chest pain and that they told him it was related to anxiety.  Patient does say that he gets this pain often.  GCS 14 - presents with eyes closed.  "

## 2020-01-01 NOTE — DISCHARGE INSTRUCTIONS
As we discussed, please follow-up with your primary care physician within 1 week for hemoglobin recheck.  Please follow-up with the gastroenterology clinic listed above for complete recheck.  Return to the emergency department if you develop any new or worsening symptoms, this includes worsening chest pain or recurrent chest pain, lightheadedness, nausea, vomiting, or any further concerns.  Additionally, please return if your symptoms do not continue to improve over the next 24 hours, and you are unable to follow-up with primary care.

## 2020-01-06 ENCOUNTER — PATIENT OUTREACH (OUTPATIENT)
Dept: HEALTH INFORMATION MANAGEMENT | Facility: OTHER | Age: 55
End: 2020-01-06

## 2020-01-06 NOTE — PROGRESS NOTES
Pt has not established with a PCP yet. I informed pt of the importance of a PCP to lower his risk of readmission. Pt understood. I will email pt a list of job opportunities per request and f/u with pt later this week.

## 2020-01-10 ENCOUNTER — PATIENT OUTREACH (OUTPATIENT)
Dept: HEALTH INFORMATION MANAGEMENT | Facility: OTHER | Age: 55
End: 2020-01-10

## 2021-05-01 ENCOUNTER — APPOINTMENT (OUTPATIENT)
Dept: RADIOLOGY | Facility: MEDICAL CENTER | Age: 56
End: 2021-05-01
Attending: EMERGENCY MEDICINE

## 2021-05-01 ENCOUNTER — HOSPITAL ENCOUNTER (EMERGENCY)
Facility: MEDICAL CENTER | Age: 56
End: 2021-05-01
Attending: EMERGENCY MEDICINE

## 2021-05-01 VITALS
WEIGHT: 160 LBS | HEART RATE: 94 BPM | DIASTOLIC BLOOD PRESSURE: 67 MMHG | BODY MASS INDEX: 25.71 KG/M2 | RESPIRATION RATE: 15 BRPM | OXYGEN SATURATION: 93 % | TEMPERATURE: 98.3 F | HEIGHT: 66 IN | SYSTOLIC BLOOD PRESSURE: 132 MMHG

## 2021-05-01 DIAGNOSIS — R06.00 DYSPNEA, UNSPECIFIED TYPE: ICD-10-CM

## 2021-05-01 LAB — POC BREATHALIZER: 0.11 PERCENT (ref 0–0.01)

## 2021-05-01 PROCEDURE — 99284 EMERGENCY DEPT VISIT MOD MDM: CPT

## 2021-05-01 PROCEDURE — 71045 X-RAY EXAM CHEST 1 VIEW: CPT

## 2021-05-01 PROCEDURE — 93005 ELECTROCARDIOGRAM TRACING: CPT | Performed by: EMERGENCY MEDICINE

## 2021-05-01 PROCEDURE — 302970 POC BREATHALIZER: Performed by: EMERGENCY MEDICINE

## 2021-05-01 ASSESSMENT — LIFESTYLE VARIABLES
DO YOU DRINK ALCOHOL: YES
DOES PATIENT WANT TO STOP DRINKING: NO

## 2021-05-01 ASSESSMENT — FIBROSIS 4 INDEX: FIB4 SCORE: 5.42

## 2021-05-02 NOTE — ED PROVIDER NOTES
"ED Provider Note    CHIEF COMPLAINT  Chief Complaint   Patient presents with   • Shortness of Breath   • Alcohol Intoxication        HPI    Primary care provider: Pcp Pt States None   History obtained from: Patient  History limited by: Patient's intoxication    Thaddeus Adan is a 55 y.o. male who [facility] for reported lower extremity pain but actually tells me that he is having shortness of breath.  He states that he has been having shortness of breath since about 7:00 this evening.  His original main complaint to me is \"I have a problem with drinking, doctor, I will be honest with you, I am an alcoholic.\"  Patient states that he drinks alcohol \"whenever\" and unable to quantify how much or how often.  Patient states that he has never had alcohol treatment and does not want treatment for alcohol use.  He denies suicidal or homicidal ideations.  He denies drug use.  Patient unsure if he has had a fever.  He has had some cough at times.  He denies any pain at this time except for chronic bilateral lower leg pain.  He denies nausea/vomiting/diarrhea.  He reports that he has had lower extremity pain for \"a long time\" and has been seen by doctors previously and that the swelling is much better now.  He denies recent injury or trauma.    REVIEW OF SYSTEMS  Please see HPI for pertinent positives/negatives.  Limited by patient's intoxicated state.    PAST MEDICAL HISTORY  Past Medical History:   Diagnosis Date   • Diabetes (HCC)    • Hypertension         SURGICAL HISTORY  History reviewed. No pertinent surgical history.     SOCIAL HISTORY  Social History     Tobacco Use   • Smoking status: Current Every Day Smoker     Packs/day: 0.00     Years: 25.00     Pack years: 0.00     Types: Cigarettes     Start date: 1995   • Smokeless tobacco: Never Used   Substance and Sexual Activity   • Alcohol use: Yes   • Drug use: Not Currently   • Sexual activity: Not on file        FAMILY " "HISTORY  Family History   Problem Relation Age of Onset   • Heart Disease Father         CURRENT MEDICATIONS  Home Medications    **Home medications have not yet been reviewed for this encounter**          ALLERGIES  No Known Allergies     PHYSICAL EXAM  VITAL SIGNS: /67   Pulse 94   Temp 36.8 °C (98.3 °F) (Temporal)   Resp 15   Ht 1.676 m (5' 6\")   Wt 72.6 kg (160 lb)   SpO2 93%   BMI 25.82 kg/m²  @VARSHA[[id number]::@     Pulse ox interpretation: 94% I interpret this pulse ox as normal     Constitutional: Thin patient, drowsy but easily arousable in no apparent distress, nontoxic appearance    HENT: No external signs of trauma, normocephalic, mask on due to COVID-19 pandemic  Eyes: PERRL, conjunctiva without erythema, no discharge, no icterus    Neck: Soft and supple, trachea midline, no stridor, no tenderness, no LAD, no JVD, good ROM    Cardiovascular: Regular rate and rhythm, no murmurs/rubs/gallops, strong distal pulses and good perfusion    Thorax & Lungs: No respiratory distress, CTAB   Abdomen: Soft, nontender, nondistended, no guarding, no rebound, normal BS    Back: No CVAT    Extremities: No cyanosis, no edema, no gross deformity, good ROM, no tenderness, intact distal pulses with brisk cap refill    Skin: Warm, dry, no pallor/cyanosis, no rash noted except chronic appearing bilateral lower extremity skin changes  Lymphatic: No lymphadenopathy noted    Neuro: Drowsy but easily arousable and oriented to person, place, and time.  GCS 15.  CN II-XII grossly intact.  Normal speech.  Equal strength bilateral UE/LE.  Sensation intact to touch.     Psychiatric: Intoxicated but cooperative, denies suicidal or homicidal ideations, no signs of active hallucinations      DIAGNOSTIC STUDIES / PROCEDURES    EKG  12 Lead EKG obtained at 2025 and interpreted by me:   Rate: 94   Rhythm: Sinus rhythm   Ectopy: None  Intervals: Normal   Axis: Normal   QRS: Normal   ST segments: Normal  T Waves: Normal    Clinical " Impression: Sinus rhythm without acute ischemic changes or dysrhythmia       LABS  All labs reviewed by me.     Results for orders placed or performed during the hospital encounter of 21   POC BREATHALIZER   Result Value Ref Range    POC Breathalizer 0.11 (A) 0.00 - 0.01 Percent   EKG   Result Value Ref Range    Report       [facility] Emergency Dept.    Test Date:  2021  Pt Name:    DIAMOND LINK             Department: ER  MRN:        [id number]                      Room:        29  Gender:     Male                         Technician: [id number]  :        1965                   Requested By:AGATHA OLIVARES  Order #:    [id number]                    Reading MD:    Measurements  Intervals                                Axis  Rate:       94                           P:          54  RI:         168                          QRS:        63  QRSD:       74                           T:          56  QT:         360  QTc:        451    Interpretive Statements  SINUS RHYTHM  Compared to ECG 2019 22:15:04  Sinus tachycardia no longer present          RADIOLOGY  The radiologist's interpretation of all radiological studies have been reviewed by me.     DX-CHEST-PORTABLE (1 VIEW)   Final Result         1. No acute cardiopulmonary abnormalities are identified.             COURSE & MEDICAL DECISION MAKING  Nursing notes, VS, PMSFHx reviewed in chart.     Review of past medical records shows the patient was last seen in this ED 2019 for shortness of breath.  CTPA on 2020 with the following findings:    1.  No CT evidence of pulmonary embolism.     2.  Minimal opacifications posteriorly in each lung base could be due to discoid atelectasis.     3.  Decreased liver attenuation is consistent with hepatic steatosis.    He was last admitted to this hospital December 15, 2019 for chest pain and alcohol intoxication and discharged on 2019.  Nuclear stress test on  December 5, 2019 with the following findings:    No reversible defects that would indicate ischemia.    ECG INTERPRETATION   Negative stress ECG for ischemia.      Differential diagnoses considered include but are not limited to: AMI, pericardial effusion/tamponade, pericarditis, CHF/pulm edema, PE, pneumothorax, pneumonia, pleural effusion, COPD, bronchospasm, bronchitis, respiratory infection, DKA, metabolic acidosis, anxiety/hyperventilation       History and physical exam as above.  Patient noted to be intoxicated and admits to chronic alcohol abuse.  He reported having difficulty with breathing but was noted to be in no acute distress nontoxic in appearance and exam is benign.  EKG without acute findings and chest x-ray without evidence for acute abnormality as above.  Low clinical suspicion for serious acute pathology such as ACS, PE, decompensated CHF given the history/exam/findings.  Patient was monitored in the ED and remained clinically stable.  I was later told by ED nurse that patient requested to be discharged but left the ED before he can be reevaluated.  Per ED nurse, patient ate without difficulty and was ambulating unassisted without any difficulty prior to leaving the ED.      The patient is referred to a primary physician for blood pressure management, diabetic screening, and for all other preventative health concerns.       FINAL IMPRESSION  1. Dyspnea, unspecified type Acute   2. Alcoholism /alcohol abuse (HCC) Active          DISPOSITION  Patient eloped from the ED      FOLLOW UP  No follow-up provider specified.       OUTPATIENT MEDICATIONS  Discharge Medication List as of 5/1/2021 11:22 PM             Electronically signed by: Benedict Lewis D.O., 5/1/2021 8:21 PM      Portions of this record were made with voice recognition software.  Despite my review, spelling/grammar/context errors may still remain.  Interpretation of this chart should be taken in this context.

## 2021-05-03 LAB — EKG IMPRESSION: NORMAL

## 2021-05-20 ENCOUNTER — HOSPITAL ENCOUNTER (EMERGENCY)
Dept: HOSPITAL 8 - ED | Age: 56
Discharge: HOME | End: 2021-05-20
Payer: SELF-PAY

## 2021-05-20 VITALS — DIASTOLIC BLOOD PRESSURE: 90 MMHG | SYSTOLIC BLOOD PRESSURE: 146 MMHG

## 2021-05-20 VITALS — HEIGHT: 66 IN | BODY MASS INDEX: 27.64 KG/M2 | WEIGHT: 171.96 LBS

## 2021-05-20 DIAGNOSIS — F10.120: ICD-10-CM

## 2021-05-20 DIAGNOSIS — E11.9: ICD-10-CM
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
## 2021-05-20 DIAGNOSIS — S12.9XXA: Primary | ICD-10-CM

## 2021-05-20 DIAGNOSIS — S09.90XA: ICD-10-CM

## 2021-05-20 DIAGNOSIS — M54.2: ICD-10-CM

## 2021-05-20 DIAGNOSIS — S00.83XA: ICD-10-CM
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
## 2021-05-20 LAB
<PLATELET ESTIMATE>: (no result)
ALBUMIN SERPL-MCNC: 2.6 G/DL (ref 3.4–5)
ANION GAP SERPL CALC-SCNC: 7 MMOL/L (ref 5–15)
ANISOCYTOSIS BLD QL SMEAR: (no result)
BAND#(MANUAL): 0.08 X10^3/UL
BASOPHILS NFR BLD MANUAL: 4 % (ref 0–1)
BASOS#(MANUAL): 0.33 X10^3/UL (ref 0–0.1)
CALCIUM SERPL-MCNC: 7.9 MG/DL (ref 8.5–10.1)
CHLORIDE SERPL-SCNC: 110 MMOL/L (ref 98–107)
CREAT SERPL-MCNC: 1.7 MG/DL (ref 0.7–1.3)
EOS#(MANUAL): 1.33 X10^3/UL (ref 0–0.4)
EOS% (MANUAL): 16 % (ref 1–7)
ERYTHROCYTE [DISTWIDTH] IN BLOOD BY AUTOMATED COUNT: 14 % (ref 9.4–14.8)
LYMPH#(MANUAL): 2.91 X10^3/UL (ref 1–3.4)
LYMPHS% (MANUAL): 35 % (ref 22–44)
MCH RBC QN AUTO: 33 PG (ref 27.5–34.5)
MCHC RBC AUTO-ENTMCNC: 33.9 G/DL (ref 33.2–36.2)
MD: YES
MONOS#(MANUAL): 0.25 X10^3/UL (ref 0.3–2.7)
MONOS% (MANUAL): 3 % (ref 2–9)
NEUTS BAND NFR BLD: 1 % (ref 0–7)
PLATELET # BLD AUTO: 650 X10^3/UL (ref 130–400)
PMV BLD AUTO: 6.1 FL (ref 7.4–10.4)
RBC # BLD AUTO: 3.82 X10^6/UL (ref 4.38–5.82)
SEG#(MANUAL): 3.4 X10^3/UL (ref 1.8–6.8)
SEGS% (MANUAL): 41 % (ref 42–75)

## 2021-05-20 PROCEDURE — 80320 DRUG SCREEN QUANTALCOHOLS: CPT

## 2021-05-20 PROCEDURE — 72125 CT NECK SPINE W/O DYE: CPT

## 2021-05-20 PROCEDURE — 70498 CT ANGIOGRAPHY NECK: CPT

## 2021-05-20 PROCEDURE — 82040 ASSAY OF SERUM ALBUMIN: CPT

## 2021-05-20 PROCEDURE — 70450 CT HEAD/BRAIN W/O DYE: CPT

## 2021-05-20 PROCEDURE — 80048 BASIC METABOLIC PNL TOTAL CA: CPT

## 2021-05-20 PROCEDURE — 85025 COMPLETE CBC W/AUTO DIFF WBC: CPT

## 2021-05-20 PROCEDURE — 96360 HYDRATION IV INFUSION INIT: CPT

## 2021-05-20 PROCEDURE — 82962 GLUCOSE BLOOD TEST: CPT

## 2021-05-20 PROCEDURE — 99285 EMERGENCY DEPT VISIT HI MDM: CPT

## 2021-05-20 PROCEDURE — G0480 DRUG TEST DEF 1-7 CLASSES: HCPCS

## 2021-05-20 NOTE — NUR
PT BIB EMS FOR A GLF AFTER DRINKING A PINT OF ALCOHOL TODAY. PT HAS A INCH 
HEMATOMA ON RIGHT SIDE OF FOREHEAD. PT ALSO C/O BEING UNABLE TO MOVE HANDS AND 
FINGERS BEING TENDER. WHEN GETTING CHECKED IN BY RN PT NOTED TO BE MOVING ALL 
EXTREMITIES. LOC UNKNOWN AT TIME OF FALL. NAD, BED IN LOWEST, RAILS ENGAGED, 
CALL LIGHT ON LAP, WCTM.



OLLIE REBOLLAR AT  FOR EVAL AND POC.

## 2021-05-20 NOTE — NUR
PT REC'VD DISCHARGE INSTRUCTIONS AND EDUCATION. PT HAD NO FURTHER QUESTIONS. PT 
GIVEN TAXI VOUCHER. PT AMBULATED TO LA AREA, STEADY GAIT.

## 2021-05-23 ENCOUNTER — HOSPITAL ENCOUNTER (EMERGENCY)
Dept: HOSPITAL 8 - ED | Age: 56
Discharge: HOME | End: 2021-05-23
Payer: SELF-PAY

## 2021-05-23 VITALS — WEIGHT: 144.84 LBS | BODY MASS INDEX: 23.28 KG/M2 | HEIGHT: 66 IN

## 2021-05-23 VITALS — DIASTOLIC BLOOD PRESSURE: 87 MMHG | SYSTOLIC BLOOD PRESSURE: 148 MMHG

## 2021-05-23 DIAGNOSIS — S00.81XA: ICD-10-CM

## 2021-05-23 DIAGNOSIS — I10: ICD-10-CM
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
## 2021-05-23 DIAGNOSIS — F10.120: ICD-10-CM

## 2021-05-23 DIAGNOSIS — S00.83XA: Primary | ICD-10-CM
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
## 2021-05-23 DIAGNOSIS — E11.9: ICD-10-CM

## 2021-05-23 PROCEDURE — 99284 EMERGENCY DEPT VISIT MOD MDM: CPT

## 2021-05-23 PROCEDURE — 70486 CT MAXILLOFACIAL W/O DYE: CPT

## 2021-05-23 NOTE — NUR
BIBA FROM [facility] FOR [facility] FROM FALL 4 DAYS AGO. PT DENIES LOC. PT HAS HEMATOMA 
TO UPPER RIGHT FOREHEAD WITH SCAB, PERRLA. PT STATES HE HAS BEEN DRINKING TO 
HELP THE PAIN. PT QUICKLY FALLING ASLEEP IN GURNEY AFTER GIVEN WARM BLANKET. 
PER EMS PT REFUSED VITALS EN ROUTE.

## 2021-05-23 NOTE — NUR
PT OKAYED FOR STAFF TO GIVE HIS 1ST COUSIN, PRAKASH PHILLIPS INFO CONCERNING 
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 
UNDERSTAND.  

PRAKASH PHILLIPS - [phone]

## 2021-07-11 NOTE — ED NOTES
"Pt stated \"I can't wait any longer I have work early in the morning\" Pt left ambulatory with steady gait to [facility]. ERP notified of elopement.   "
EKG obtained and given to ERP.   
ERP at bedside.   
Pt ate entire meal, states SOB has resolved and is ready to leave, ERP notified.   
Pt provided hot meal per ERP.   
Pt resting comfortably, on monitor, call light in reach.   
Pt unable to complete breathalyzer at this time.   
X ray at bedside.   
Negative

## 2022-01-01 ENCOUNTER — HOSPITAL ENCOUNTER (EMERGENCY)
Facility: MEDICAL CENTER | Age: 57
End: 2022-08-02
Attending: EMERGENCY MEDICINE

## 2022-01-01 ENCOUNTER — HOSPITAL ENCOUNTER (EMERGENCY)
Facility: MEDICAL CENTER | Age: 57
End: 2022-07-22
Attending: EMERGENCY MEDICINE

## 2022-01-01 ENCOUNTER — HOSPITAL ENCOUNTER (EMERGENCY)
Facility: MEDICAL CENTER | Age: 57
End: 2022-11-15
Attending: EMERGENCY MEDICINE

## 2022-01-01 ENCOUNTER — APPOINTMENT (OUTPATIENT)
Dept: RADIOLOGY | Facility: MEDICAL CENTER | Age: 57
End: 2022-01-01
Attending: EMERGENCY MEDICINE

## 2022-01-01 ENCOUNTER — APPOINTMENT (OUTPATIENT)
Dept: RADIOLOGY | Facility: MEDICAL CENTER | Age: 57
End: 2022-01-01

## 2022-01-01 ENCOUNTER — HOSPITAL ENCOUNTER (EMERGENCY)
Facility: MEDICAL CENTER | Age: 57
End: 2022-08-01
Attending: EMERGENCY MEDICINE

## 2022-01-01 VITALS
HEART RATE: 89 BPM | BODY MASS INDEX: 23.3 KG/M2 | SYSTOLIC BLOOD PRESSURE: 167 MMHG | HEIGHT: 66 IN | TEMPERATURE: 98 F | WEIGHT: 145 LBS | RESPIRATION RATE: 20 BRPM | DIASTOLIC BLOOD PRESSURE: 79 MMHG | OXYGEN SATURATION: 93 %

## 2022-01-01 VITALS
HEART RATE: 98 BPM | SYSTOLIC BLOOD PRESSURE: 174 MMHG | WEIGHT: 145.06 LBS | OXYGEN SATURATION: 94 % | TEMPERATURE: 97.5 F | RESPIRATION RATE: 15 BRPM | BODY MASS INDEX: 23.31 KG/M2 | DIASTOLIC BLOOD PRESSURE: 102 MMHG | HEIGHT: 66 IN

## 2022-01-01 VITALS
BODY MASS INDEX: 23.3 KG/M2 | RESPIRATION RATE: 18 BRPM | TEMPERATURE: 98.4 F | HEART RATE: 88 BPM | SYSTOLIC BLOOD PRESSURE: 156 MMHG | HEIGHT: 66 IN | WEIGHT: 145 LBS | OXYGEN SATURATION: 96 % | DIASTOLIC BLOOD PRESSURE: 89 MMHG

## 2022-01-01 VITALS
WEIGHT: 145 LBS | HEART RATE: 97 BPM | DIASTOLIC BLOOD PRESSURE: 84 MMHG | SYSTOLIC BLOOD PRESSURE: 160 MMHG | TEMPERATURE: 97 F | OXYGEN SATURATION: 94 % | HEIGHT: 66 IN | BODY MASS INDEX: 23.3 KG/M2 | RESPIRATION RATE: 17 BRPM

## 2022-01-01 DIAGNOSIS — R07.9 CHEST PAIN, UNSPECIFIED TYPE: ICD-10-CM

## 2022-01-01 DIAGNOSIS — E11.65 TYPE 2 DIABETES MELLITUS WITH HYPERGLYCEMIA, WITHOUT LONG-TERM CURRENT USE OF INSULIN (HCC): ICD-10-CM

## 2022-01-01 DIAGNOSIS — F10.920 ALCOHOLIC INTOXICATION WITHOUT COMPLICATION (HCC): ICD-10-CM

## 2022-01-01 DIAGNOSIS — Z91.148 PATIENT'S OTHER NONCOMPLIANCE WITH MEDICATION REGIMEN: ICD-10-CM

## 2022-01-01 DIAGNOSIS — N18.9 CHRONIC KIDNEY DISEASE, UNSPECIFIED CKD STAGE: ICD-10-CM

## 2022-01-01 DIAGNOSIS — F10.920 ACUTE ALCOHOLIC INTOXICATION WITHOUT COMPLICATION (HCC): ICD-10-CM

## 2022-01-01 DIAGNOSIS — E86.0 DEHYDRATION: ICD-10-CM

## 2022-01-01 DIAGNOSIS — K21.9 GASTROESOPHAGEAL REFLUX DISEASE, UNSPECIFIED WHETHER ESOPHAGITIS PRESENT: ICD-10-CM

## 2022-01-01 DIAGNOSIS — F10.10 CHRONIC ALCOHOL ABUSE: ICD-10-CM

## 2022-01-01 LAB
ALBUMIN SERPL BCP-MCNC: 3.2 G/DL (ref 3.2–4.9)
ALBUMIN SERPL BCP-MCNC: 3.3 G/DL (ref 3.2–4.9)
ALBUMIN SERPL BCP-MCNC: 3.6 G/DL (ref 3.2–4.9)
ALBUMIN/GLOB SERPL: 0.8 G/DL
ALBUMIN/GLOB SERPL: 0.9 G/DL
ALBUMIN/GLOB SERPL: 1 G/DL
ALP SERPL-CCNC: 139 U/L (ref 30–99)
ALP SERPL-CCNC: 154 U/L (ref 30–99)
ALP SERPL-CCNC: 178 U/L (ref 30–99)
ALT SERPL-CCNC: 39 U/L (ref 2–50)
ALT SERPL-CCNC: 44 U/L (ref 2–50)
ALT SERPL-CCNC: 45 U/L (ref 2–50)
ANION GAP SERPL CALC-SCNC: 14 MMOL/L (ref 7–16)
ANION GAP SERPL CALC-SCNC: 15 MMOL/L (ref 7–16)
ANION GAP SERPL CALC-SCNC: 17 MMOL/L (ref 7–16)
AST SERPL-CCNC: 107 U/L (ref 12–45)
AST SERPL-CCNC: 148 U/L (ref 12–45)
AST SERPL-CCNC: 98 U/L (ref 12–45)
BASOPHILS # BLD AUTO: 1.4 % (ref 0–1.8)
BASOPHILS # BLD AUTO: 2 % (ref 0–1.8)
BASOPHILS # BLD AUTO: 2.4 % (ref 0–1.8)
BASOPHILS # BLD: 0.09 K/UL (ref 0–0.12)
BASOPHILS # BLD: 0.14 K/UL (ref 0–0.12)
BASOPHILS # BLD: 0.15 K/UL (ref 0–0.12)
BILIRUB SERPL-MCNC: 0.2 MG/DL (ref 0.1–1.5)
BLOOD CULTURE HOLD CXBCH: NORMAL
BUN SERPL-MCNC: 14 MG/DL (ref 8–22)
BUN SERPL-MCNC: 19 MG/DL (ref 8–22)
BUN SERPL-MCNC: 24 MG/DL (ref 8–22)
CALCIUM SERPL-MCNC: 7.9 MG/DL (ref 8.5–10.5)
CALCIUM SERPL-MCNC: 8 MG/DL (ref 8.5–10.5)
CALCIUM SERPL-MCNC: 8.3 MG/DL (ref 8.5–10.5)
CHLORIDE SERPL-SCNC: 105 MMOL/L (ref 96–112)
CHLORIDE SERPL-SCNC: 106 MMOL/L (ref 96–112)
CHLORIDE SERPL-SCNC: 98 MMOL/L (ref 96–112)
CO2 SERPL-SCNC: 17 MMOL/L (ref 20–33)
CO2 SERPL-SCNC: 17 MMOL/L (ref 20–33)
CO2 SERPL-SCNC: 22 MMOL/L (ref 20–33)
CREAT SERPL-MCNC: 1.55 MG/DL (ref 0.5–1.4)
CREAT SERPL-MCNC: 1.59 MG/DL (ref 0.5–1.4)
CREAT SERPL-MCNC: 2.23 MG/DL (ref 0.5–1.4)
EKG IMPRESSION: NORMAL
EKG IMPRESSION: NORMAL
EOSINOPHIL # BLD AUTO: 0.24 K/UL (ref 0–0.51)
EOSINOPHIL # BLD AUTO: 0.37 K/UL (ref 0–0.51)
EOSINOPHIL # BLD AUTO: 0.46 K/UL (ref 0–0.51)
EOSINOPHIL NFR BLD: 3.8 % (ref 0–6.9)
EOSINOPHIL NFR BLD: 6 % (ref 0–6.9)
EOSINOPHIL NFR BLD: 6.4 % (ref 0–6.9)
ERYTHROCYTE [DISTWIDTH] IN BLOOD BY AUTOMATED COUNT: 43.8 FL (ref 35.9–50)
ERYTHROCYTE [DISTWIDTH] IN BLOOD BY AUTOMATED COUNT: 45 FL (ref 35.9–50)
ERYTHROCYTE [DISTWIDTH] IN BLOOD BY AUTOMATED COUNT: 48 FL (ref 35.9–50)
ETHANOL BLD-MCNC: 378.4 MG/DL
GFR SERPLBLD CREATININE-BSD FMLA CKD-EPI: 33 ML/MIN/1.73 M 2
GFR SERPLBLD CREATININE-BSD FMLA CKD-EPI: 50 ML/MIN/1.73 M 2
GFR SERPLBLD CREATININE-BSD FMLA CKD-EPI: 52 ML/MIN/1.73 M 2
GLOBULIN SER CALC-MCNC: 3.4 G/DL (ref 1.9–3.5)
GLOBULIN SER CALC-MCNC: 3.7 G/DL (ref 1.9–3.5)
GLOBULIN SER CALC-MCNC: 4.7 G/DL (ref 1.9–3.5)
GLUCOSE BLD STRIP.AUTO-MCNC: 214 MG/DL (ref 65–99)
GLUCOSE SERPL-MCNC: 149 MG/DL (ref 65–99)
GLUCOSE SERPL-MCNC: 183 MG/DL (ref 65–99)
GLUCOSE SERPL-MCNC: 196 MG/DL (ref 65–99)
HCT VFR BLD AUTO: 32.5 % (ref 42–52)
HCT VFR BLD AUTO: 34.6 % (ref 42–52)
HCT VFR BLD AUTO: 42.1 % (ref 42–52)
HGB BLD-MCNC: 11.5 G/DL (ref 14–18)
HGB BLD-MCNC: 12.1 G/DL (ref 14–18)
HGB BLD-MCNC: 14.3 G/DL (ref 14–18)
IMM GRANULOCYTES # BLD AUTO: 0.01 K/UL (ref 0–0.11)
IMM GRANULOCYTES # BLD AUTO: 0.02 K/UL (ref 0–0.11)
IMM GRANULOCYTES # BLD AUTO: 0.08 K/UL (ref 0–0.11)
IMM GRANULOCYTES NFR BLD AUTO: 0.2 % (ref 0–0.9)
IMM GRANULOCYTES NFR BLD AUTO: 0.3 % (ref 0–0.9)
IMM GRANULOCYTES NFR BLD AUTO: 1.1 % (ref 0–0.9)
LYMPHOCYTES # BLD AUTO: 1.5 K/UL (ref 1–4.8)
LYMPHOCYTES # BLD AUTO: 1.85 K/UL (ref 1–4.8)
LYMPHOCYTES # BLD AUTO: 2.48 K/UL (ref 1–4.8)
LYMPHOCYTES NFR BLD: 21 % (ref 22–41)
LYMPHOCYTES NFR BLD: 29.1 % (ref 22–41)
LYMPHOCYTES NFR BLD: 40.1 % (ref 22–41)
MCH RBC QN AUTO: 32.8 PG (ref 27–33)
MCH RBC QN AUTO: 33.4 PG (ref 27–33)
MCH RBC QN AUTO: 33.6 PG (ref 27–33)
MCHC RBC AUTO-ENTMCNC: 34 G/DL (ref 33.7–35.3)
MCHC RBC AUTO-ENTMCNC: 35 G/DL (ref 33.7–35.3)
MCHC RBC AUTO-ENTMCNC: 35.4 G/DL (ref 33.7–35.3)
MCV RBC AUTO: 95 FL (ref 81.4–97.8)
MCV RBC AUTO: 95.6 FL (ref 81.4–97.8)
MCV RBC AUTO: 96.6 FL (ref 81.4–97.8)
MONOCYTES # BLD AUTO: 0.6 K/UL (ref 0–0.85)
MONOCYTES # BLD AUTO: 0.69 K/UL (ref 0–0.85)
MONOCYTES # BLD AUTO: 0.82 K/UL (ref 0–0.85)
MONOCYTES NFR BLD AUTO: 10.8 % (ref 0–13.4)
MONOCYTES NFR BLD AUTO: 13.2 % (ref 0–13.4)
MONOCYTES NFR BLD AUTO: 8.4 % (ref 0–13.4)
NEUTROPHILS # BLD AUTO: 2.36 K/UL (ref 1.82–7.42)
NEUTROPHILS # BLD AUTO: 3.47 K/UL (ref 1.82–7.42)
NEUTROPHILS # BLD AUTO: 4.36 K/UL (ref 1.82–7.42)
NEUTROPHILS NFR BLD: 38.1 % (ref 44–72)
NEUTROPHILS NFR BLD: 54.6 % (ref 44–72)
NEUTROPHILS NFR BLD: 61.1 % (ref 44–72)
NRBC # BLD AUTO: 0 K/UL
NRBC BLD-RTO: 0 /100 WBC
PLATELET # BLD AUTO: 223 K/UL (ref 164–446)
PLATELET # BLD AUTO: 227 K/UL (ref 164–446)
PLATELET # BLD AUTO: 415 K/UL (ref 164–446)
PMV BLD AUTO: 8.5 FL (ref 9–12.9)
PMV BLD AUTO: 8.8 FL (ref 9–12.9)
PMV BLD AUTO: 8.8 FL (ref 9–12.9)
POTASSIUM SERPL-SCNC: 3.4 MMOL/L (ref 3.6–5.5)
POTASSIUM SERPL-SCNC: 3.8 MMOL/L (ref 3.6–5.5)
POTASSIUM SERPL-SCNC: 4 MMOL/L (ref 3.6–5.5)
PROT SERPL-MCNC: 6.7 G/DL (ref 6–8.2)
PROT SERPL-MCNC: 6.9 G/DL (ref 6–8.2)
PROT SERPL-MCNC: 8.3 G/DL (ref 6–8.2)
RBC # BLD AUTO: 3.42 M/UL (ref 4.7–6.1)
RBC # BLD AUTO: 3.62 M/UL (ref 4.7–6.1)
RBC # BLD AUTO: 4.36 M/UL (ref 4.7–6.1)
SODIUM SERPL-SCNC: 136 MMOL/L (ref 135–145)
SODIUM SERPL-SCNC: 137 MMOL/L (ref 135–145)
SODIUM SERPL-SCNC: 138 MMOL/L (ref 135–145)
TROPONIN T SERPL-MCNC: 16 NG/L (ref 6–19)
TROPONIN T SERPL-MCNC: 16 NG/L (ref 6–19)
TROPONIN T SERPL-MCNC: 17 NG/L (ref 6–19)
WBC # BLD AUTO: 6.2 K/UL (ref 4.8–10.8)
WBC # BLD AUTO: 6.4 K/UL (ref 4.8–10.8)
WBC # BLD AUTO: 7.1 K/UL (ref 4.8–10.8)

## 2022-01-01 PROCEDURE — 700102 HCHG RX REV CODE 250 W/ 637 OVERRIDE(OP): Performed by: EMERGENCY MEDICINE

## 2022-01-01 PROCEDURE — 85025 COMPLETE CBC W/AUTO DIFF WBC: CPT

## 2022-01-01 PROCEDURE — 80053 COMPREHEN METABOLIC PANEL: CPT

## 2022-01-01 PROCEDURE — 99284 EMERGENCY DEPT VISIT MOD MDM: CPT

## 2022-01-01 PROCEDURE — 700105 HCHG RX REV CODE 258: Performed by: EMERGENCY MEDICINE

## 2022-01-01 PROCEDURE — 99285 EMERGENCY DEPT VISIT HI MDM: CPT

## 2022-01-01 PROCEDURE — 36415 COLL VENOUS BLD VENIPUNCTURE: CPT

## 2022-01-01 PROCEDURE — 93005 ELECTROCARDIOGRAM TRACING: CPT

## 2022-01-01 PROCEDURE — 82962 GLUCOSE BLOOD TEST: CPT

## 2022-01-01 PROCEDURE — 71045 X-RAY EXAM CHEST 1 VIEW: CPT

## 2022-01-01 PROCEDURE — 700111 HCHG RX REV CODE 636 W/ 250 OVERRIDE (IP): Performed by: EMERGENCY MEDICINE

## 2022-01-01 PROCEDURE — 93005 ELECTROCARDIOGRAM TRACING: CPT | Performed by: EMERGENCY MEDICINE

## 2022-01-01 PROCEDURE — 82077 ASSAY SPEC XCP UR&BREATH IA: CPT

## 2022-01-01 PROCEDURE — 84484 ASSAY OF TROPONIN QUANT: CPT

## 2022-01-01 PROCEDURE — A9270 NON-COVERED ITEM OR SERVICE: HCPCS | Performed by: EMERGENCY MEDICINE

## 2022-01-01 PROCEDURE — 96374 THER/PROPH/DIAG INJ IV PUSH: CPT

## 2022-01-01 RX ORDER — FAMOTIDINE 20 MG/1
20 TABLET, FILM COATED ORAL ONCE
Status: COMPLETED | OUTPATIENT
Start: 2022-01-01 | End: 2022-01-01

## 2022-01-01 RX ORDER — FAMOTIDINE 20 MG/1
20 TABLET, FILM COATED ORAL DAILY
Qty: 30 TABLET | Refills: 0 | Status: SHIPPED | OUTPATIENT
Start: 2022-01-01

## 2022-01-01 RX ORDER — KETOROLAC TROMETHAMINE 30 MG/ML
15 INJECTION, SOLUTION INTRAMUSCULAR; INTRAVENOUS ONCE
Status: COMPLETED | OUTPATIENT
Start: 2022-01-01 | End: 2022-01-01

## 2022-01-01 RX ORDER — ALUMINA, MAGNESIA, AND SIMETHICONE 2400; 2400; 240 MG/30ML; MG/30ML; MG/30ML
20 SUSPENSION ORAL ONCE
Status: COMPLETED | OUTPATIENT
Start: 2022-01-01 | End: 2022-01-01

## 2022-01-01 RX ORDER — SODIUM CHLORIDE 9 MG/ML
INJECTION, SOLUTION INTRAVENOUS CONTINUOUS
Status: DISCONTINUED | OUTPATIENT
Start: 2022-01-01 | End: 2022-01-01

## 2022-01-01 RX ORDER — LIDOCAINE 50 MG/G
1 PATCH TOPICAL EVERY 24 HOURS
Qty: 30 PATCH | Refills: 0 | Status: SHIPPED | OUTPATIENT
Start: 2022-01-01

## 2022-01-01 RX ORDER — SODIUM CHLORIDE, SODIUM LACTATE, POTASSIUM CHLORIDE, CALCIUM CHLORIDE 600; 310; 30; 20 MG/100ML; MG/100ML; MG/100ML; MG/100ML
1000 INJECTION, SOLUTION INTRAVENOUS ONCE
Status: COMPLETED | OUTPATIENT
Start: 2022-01-01 | End: 2022-01-01

## 2022-01-01 RX ADMIN — FAMOTIDINE 20 MG: 20 TABLET, FILM COATED ORAL at 22:56

## 2022-01-01 RX ADMIN — KETOROLAC TROMETHAMINE 15 MG: 30 INJECTION, SOLUTION INTRAMUSCULAR; INTRAVENOUS at 23:00

## 2022-01-01 RX ADMIN — ALUMINUM HYDROXIDE, MAGNESIUM HYDROXIDE, AND DIMETHICONE 20 ML: 400; 400; 40 SUSPENSION ORAL at 22:56

## 2022-01-01 RX ADMIN — SODIUM CHLORIDE, POTASSIUM CHLORIDE, SODIUM LACTATE AND CALCIUM CHLORIDE 1000 ML: 600; 310; 30; 20 INJECTION, SOLUTION INTRAVENOUS at 17:40

## 2022-01-01 ASSESSMENT — FIBROSIS 4 INDEX
FIB4 SCORE: 4.01
FIB4 SCORE: 4.15
FIB4 SCORE: 4.01

## 2022-01-01 ASSESSMENT — ENCOUNTER SYMPTOMS
CHILLS: 0
COUGH: 0
FEVER: 0
SORE THROAT: 0
NECK PAIN: 0
EYE REDNESS: 0
BLURRED VISION: 0
BACK PAIN: 0
VOMITING: 0
ABDOMINAL PAIN: 0
FOCAL WEAKNESS: 0
SEIZURES: 0
HEADACHES: 0
SHORTNESS OF BREATH: 0

## 2022-01-01 ASSESSMENT — LIFESTYLE VARIABLES: SUBSTANCE_ABUSE: 1

## 2022-07-22 NOTE — ED NOTES
Pt brought to room via w/c. Dressed in gown. Placed on monitor. Provided blankets for comfort. Up for ERP to see.

## 2022-07-22 NOTE — ED TRIAGE NOTES
Thaddeus Adan  57 y.o. male  Chief Complaint   Patient presents with   • Chest Pain     X1 week. L sided. 9/10 with tightness.   • Shortness of Breath     X1 week  Seen at [facility] for same last week     Pt to triage in wheelchair for above complaint. EKG complete by tech. Pt reports chest pain and shortness of breath that started x1 week ago. Seen at [facility] for same. CP protocol ordered.    Pt is alert, oriented, and follows commands. Pt speaking in full sentences and responds appropriately to questions. No acute distress noted in triage and respirations are even and unlabored.     Pt placed in phlebotomy chair for lab draw and educated on triage process. Pt encouraged to alert staff for any changes in condition.

## 2022-07-22 NOTE — ED PROVIDER NOTES
ED Provider Note    Scribed for Victor Hugo Jerez M.D. by Maureen Pascal. 7/21/2022,  10:46 PM.    Means of Arrival: EMS  History obtained from: Patient   History limited by: None     CHIEF COMPLAINT  Chief Complaint   Patient presents with   • Chest Pain     X1 week. L sided. 9/10 with tightness.   • Shortness of Breath     X1 week  Seen at [facility] for same last week       HPI  Thaddeus Adan is a 57 y.o. male who presents to the Emergency Department via EMS for evaluation of intermittent left sided chest pain onset a couple months ago. Patient states that he has been having dyspnea secondary to his pain which prompted his arrival today. He states that he was at [facility] last week and was diagnosed with rib fractures. He denies any falls or trauma recently. Patient reports that he used to take 650mg of ibuprofen for his pain until he ran out of his prescription. He notes that he did not  the prescriptions that [facility] sent because he did not want to. He admits to associated symptoms of left sided rib pain, but denies fevers. He denies ever using an inhaler. Patient adds that he had a couple drinks tonight as well to alleviate his pain before he was supposed to go into work tonight. No alleviating factors were reported.     REVIEW OF SYSTEMS  CONSTITUTIONAL:  No fever.  CARDIOVASCULAR:  See HPI  RESPIRATORY:  See HPI  GASTROINTESTINAL:  No abdominal pain.  GENITOURINARY:   No dysuria.  MUSCULOSKELETAL:  No arthralgia.  SKIN:  No rash or suspicious lesions.  NEUROLOGIC:   No headache.  See HPI for further details.   All other systems are negative.     PAST MEDICAL HISTORY  Past Medical History:   Diagnosis Date   • Diabetes (HCC)    • Hypertension        FAMILY HISTORY  Family History   Problem Relation Age of Onset   • Heart Disease Father        SOCIAL HISTORY   reports that he has been smoking cigarettes. He started smoking about 27 years ago. He has been smoking about 0.00 packs per day for the past  "25.00 years. He has never used smokeless tobacco. He reports current alcohol use. He reports previous drug use.    SURGICAL HISTORY  History reviewed. No pertinent surgical history.    CURRENT MEDICATIONS  Home Medications     Reviewed by Lashaun Jorge R.N. (Registered Nurse) on 22 at 2130  Med List Status: <None>   Medication Last Dose Status   amLODIPine (NORVASC) 10 MG Tab  Active   glipiZIDE (GLUCOTROL) 5 MG Tab  Active   losartan (COZAAR) 100 MG Tab  Active   metformin (GLUCOPHAGE) 1000 MG tablet  Active   multivitamin (THERAGRAN) Tab  Active   omeprazole (PRILOSEC) 20 MG delayed-release capsule  Active                ALLERGIES  No Known Allergies    PHYSICAL EXAM  VITAL SIGNS: BP (!) 169/77   Pulse 96   Temp 36.7 °C (98 °F) (Temporal)   Resp 17   Ht 1.676 m (5' 6\")   Wt 65.8 kg (145 lb)   SpO2 96%   BMI 23.40 kg/m²      Gen: Alert, no acute distress  HEENT: ATNC  Eyes: PERRL, EOMI, normal conjunctiva.   Neck: trachea midline  Resp: no respiratory distress, CTAB. No wheezes or crackles  CV: No JVD, RRR. No M/R/G. Equal radial pulses. No CVA tenderness. Tenderness to palpation of left upper quadrant and left anterior chest. No rash  Abd: non-distended, soft, non-tender  Ext: No deformities. No pedal edema, no calf tenderness  Psych: normal mood  Neuro: speech fluent     DIAGNOSTIC STUDIES / PROCEDURES     EKG  Results for orders placed or performed during the hospital encounter of 22   EKG   Result Value Ref Range    Report       [facility] Emergency Dept.    Test Date:  2022  Pt Name:    DIAMOND LINK             Department: ER  MRN:        [id number]                      Room:  Gender:     Male                         Technician: [id number]  :        1965                   Requested By:ER TRIAGE PROTOCOL  Order #:    [id number]                    Audie MENDOZA: Victor Hugo Jerez    Measurements  Intervals                                Axis  Rate:       97                 "           P:          47  IN:         176                          QRS:        25  QRSD:       76                           T:          57  QT:         344  QTc:        437    Interpretive Statements  SINUS RHYTHM  BASELINE WANDER IN LEAD(S) V5  Compared to ECG 05/01/2021 20:25:29  No significant changes  Electronically Signed On 7- 22:41:17 PDT by Victor Hugo Jerez          LABS  Labs Reviewed   CBC WITH DIFFERENTIAL - Abnormal; Notable for the following components:       Result Value    RBC 3.62 (*)     Hemoglobin 12.1 (*)     Hematocrit 34.6 (*)     MCH 33.4 (*)     MPV 8.8 (*)     All other components within normal limits   COMP METABOLIC PANEL - Abnormal; Notable for the following components:    Co2 17 (*)     Glucose 183 (*)     Creatinine 1.59 (*)     Calcium 8.0 (*)     AST(SGOT) 107 (*)     Alkaline Phosphatase 139 (*)     All other components within normal limits   ESTIMATED GFR - Abnormal; Notable for the following components:    GFR (CKD-EPI) 50 (*)     All other components within normal limits   TROPONIN   TROPONIN     All labs reviewed by me.    RADIOLOGY  DX-CHEST-PORTABLE (1 VIEW)   Final Result         1.  No acute cardiopulmonary disease.        The radiologist’s interpretation of all radiology studies have been reviewed by me.    COURSE & MEDICAL DECISION MAKING  Pertinent Labs & Imaging studies reviewed. (See chart for details)    10:46 PM Patient seen and examined at bedside. Patient will be treated with Pepcid 20mg tablet, Maalox 20mL suspension, and Toradol 15mg injection for his symptoms. Review of patient's past medical records show that he was seen at [facility] at the start of this month and again 5 days ago with right sided chest wall pain and was diagnosed with rib fractures. Discussed plan of care with patient. I informed them that labs and imaging will be ordered to evaluate symptoms. Patient is understanding and agreeable with plan.     12:14 AM - I reevaluated the  patient at bedside. The patient informs me they feel improved.  Repeat abdominal examination demonstrates no tenderness.  I discussed the patient's diagnostic study results which show no signs of heart attack or new broken ribs. I discussed plan for discharge and follow up as outlined below. The patient is stable for discharge at this time and will return for any new or worsening symptoms. Patient verbalizes understanding and support with my plan for discharge.     Medical Decision Making:  Patient presents with chest pain on the left-hand side.  Review medical record demonstrates the patient was recently seen and treated for rib fractures on the right-hand side.  He does have chest wall and left upper quad abdominal tenderness.  He does endorse drinking alcohol.  Possible gastritis/GERD as an etiology of his symptoms.  Chest x-ray demonstrates no pneumothorax, pneumonia, hemothorax.  No evidence of new trauma.  The patient has a reassuring troponin.  Repeat troponin also reassuring.  Patient felt markedly improved after Maalox, Pepcid.  Will prescribe Pepcid for him.  Patient does have a slightly elevated creatinine, however this appears to be consistent with prior lab values that he has had.      DISPOSITION:  Patient will be discharged home in stable condition.    FOLLOW UP:  [facility], Emergency Dept  [address] [facility] [address] [phone]    If symptoms worsen    Your regular doctor.          [facility] - [facility]al [facility]  [address] [facility] [address]  [phone]        [facility] - Primary Care and Family Medicine  [address] [facility] [address]  [phone]          OUTPATIENT MEDICATIONS:  Discharge Medication List as of 7/22/2022 12:15 AM      START taking these medications    Details   famotidine (PEPCID) 20 MG Tab Take 1 Tablet by mouth every day., Disp-30 Tablet, R-0, Normal      lidocaine (LIDODERM) 5 %  Patch Place 1 Patch on the skin every 24 hours. Apply to site of pain. Wear for 12 hours, then remove for 12 hours, Disp-30 Patch, R-0, Normal             FINAL IMPRESSION  1. Chest pain, unspecified type    2. Gastroesophageal reflux disease, unspecified whether esophagitis present            Maureen REEVES (Scribe), am scribing for, and in the presence of, Victor Hugo Jerez M.D..    Electronically signed by: Maureen Pascal (Jesusibe), 7/21/2022    Victor Hugo REEVES M.D. personally performed the services described in this documentation, as scribed by Maureen Pascal in my presence, and it is both accurate and complete. C.     The note accurately reflects work and decisions made by me.  Victor Hugo Jerez M.D.  7/22/2022  4:41 AM      This dictation was created using voice recognition software. The accuracy of the dictation is limited to the abilities of the software. I expect there may be some errors of grammar and possibly content. The nursing notes were reviewed and certain aspects of this information were incorporated into this note.

## 2022-07-22 NOTE — DISCHARGE INSTRUCTIONS
You were seen for chest pain. You appear to have chest wall pain from your fractures and likely acid reflux. You are being sent with a medication but may also use maalox or tums with this medication.

## 2022-08-01 NOTE — DISCHARGE PLANNING
Medical Social Work    MSW received a voalte message from bedside RN that pt is in need of a cab voucher to the shelter.  RN states that pt can ambulate safely but ERP is requesting that pt not walk.  MSW contacted Martina at the [facility] who verified that pt has a bed (290B) and may return.  MSW provided bedside RN with cab voucher (# [id number]) for pt to return to the shelter safely.  Pt does not qualify for MTM.

## 2022-08-01 NOTE — ED PROVIDER NOTES
"ED Provider Note    CHIEF COMPLAINT  Chief Complaint   Patient presents with   • Chest Pain     Pt reports left sided chest pain starting a few hours ago. Pt given 324mg of ASA by EMS with some relief.        HPI  Thaddeus Adan is a 57 y.o. male with history of diabetes, hypertension, alcohol abuse who presents to the emergency department with chest pain.  Patient has been seen here frequently for similar complaints.  He states that the pain started a few hours ago while he was at work.  Pain is completely resolved at this time.  He denies any shortness of breath, fevers, cough, leg swelling.  The patient is intoxicated and states he drank alcohol this evening.    History is limited due to alcohol intoxication    REVIEW OF SYSTEMS  See HPI for further details.   Review of Systems   Constitutional: Negative for chills and fever.   HENT: Negative for sore throat.    Eyes: Negative for blurred vision and redness.   Respiratory: Negative for cough and shortness of breath.    Cardiovascular: Positive for chest pain. Negative for leg swelling.   Gastrointestinal: Negative for abdominal pain and vomiting.   Genitourinary: Negative for dysuria and urgency.   Musculoskeletal: Negative for back pain and neck pain.   Skin: Negative for rash.   Neurological: Negative for focal weakness, seizures and headaches.   Psychiatric/Behavioral: Positive for substance abuse. Negative for suicidal ideas.         PAST MEDICAL HISTORY   has a past medical history of Diabetes (HCC) and Hypertension.    SOCIAL HISTORY  Social History     Tobacco Use   • Smoking status: Current Every Day Smoker     Packs/day: 0.25     Years: 25.00     Pack years: 6.25     Types: Cigarettes     Start date: 1995   • Smokeless tobacco: Never Used   Vaping Use   • Vaping Use: Not on file   Substance and Sexual Activity   • Alcohol use: Yes     Comment: \"1/4 of a pint\"   • Drug use: Not Currently   • Sexual activity: Not on file       SURGICAL HISTORY  patient " "denies any surgical history    CURRENT MEDICATIONS  Home Medications     Reviewed by Imelda Pizarro R.N. (Registered Nurse) on 08/01/22 at 0031  Med List Status: Partial   Medication Last Dose Status   amLODIPine (NORVASC) 10 MG Tab  Active   famotidine (PEPCID) 20 MG Tab  Active   glipiZIDE (GLUCOTROL) 5 MG Tab  Active   lidocaine (LIDODERM) 5 % Patch  Active   losartan (COZAAR) 100 MG Tab  Active   metformin (GLUCOPHAGE) 1000 MG tablet  Active   multivitamin (THERAGRAN) Tab  Active   omeprazole (PRILOSEC) 20 MG delayed-release capsule  Active                ALLERGIES  No Known Allergies    PHYSICAL EXAM   VITAL SIGNS: BP (!) 156/89   Pulse 88   Temp 36.9 °C (98.4 °F)   Resp 18   Ht 1.676 m (5' 6\")   Wt 65.8 kg (145 lb)   SpO2 96%   BMI 23.40 kg/m²      Physical Exam  Constitutional:       General: He is not in acute distress.     Comments: Nontoxic-appearing middle-aged male   HENT:      Head: Normocephalic and atraumatic.   Eyes:      Conjunctiva/sclera: Conjunctivae normal.      Pupils: Pupils are equal, round, and reactive to light.   Cardiovascular:      Rate and Rhythm: Normal rate and regular rhythm.      Heart sounds: Normal heart sounds.   Pulmonary:      Effort: Pulmonary effort is normal. No respiratory distress.      Breath sounds: Normal breath sounds.   Abdominal:      General: There is no distension.      Palpations: Abdomen is soft.      Tenderness: There is no abdominal tenderness.   Musculoskeletal:         General: No tenderness. Normal range of motion.      Cervical back: Normal range of motion and neck supple.   Skin:     General: Skin is warm and dry.   Neurological:      Mental Status: He is alert and oriented to person, place, and time.      Comments: Moving all extremities spontaneously   Psychiatric:         Mood and Affect: Affect normal.      Comments: Intoxicated, difficult to assess           DIAGNOSTIC STUDIES    EKG  Results for orders placed or performed during the hospital " encounter of 22   EKG (NOW)   Result Value Ref Range    Report       [facility] Emergency Dept.    Test Date:  2022  Pt Name:    DIAMOND LINK             Department: ER  MRN:        [id number]                      Room:  Gender:     Male                         Technician: [id number]  :        1965                   Requested By:ER TRIAGE PROTOCOL  Order #:    [id number]                    Reading MD: Kenisha Shultz MD    Measurements  Intervals                                Axis  Rate:       96                           P:          36  IA:         172                          QRS:        26  QRSD:       76                           T:          51  QT:         348  QTc:        440    Interpretive Statements  SINUS RHYTHM  Normal intervals, no ectopy  No ST or T wave change  Compared to ECG 2022 21:19:43  No significant changes  Electronically Signed On 2022 2:12:03 PDT by Kenisha Shultz MD           LABS  Personally reviewed by me  Labs Reviewed   CBC WITH DIFFERENTIAL - Abnormal; Notable for the following components:       Result Value    RBC 3.42 (*)     Hemoglobin 11.5 (*)     Hematocrit 32.5 (*)     MCH 33.6 (*)     MCHC 35.4 (*)     MPV 8.8 (*)     Neutrophils-Polys 38.10 (*)     Basophils 2.40 (*)     Baso (Absolute) 0.15 (*)     All other components within normal limits   COMP METABOLIC PANEL - Abnormal; Notable for the following components:    Co2 17 (*)     Glucose 149 (*)     Creatinine 1.55 (*)     Calcium 7.9 (*)     AST(SGOT) 98 (*)     Alkaline Phosphatase 154 (*)     Globulin 3.7 (*)     All other components within normal limits   ESTIMATED GFR - Abnormal; Notable for the following components:    GFR (CKD-EPI) 52 (*)     All other components within normal limits   TROPONIN   BLOOD CULTURE,HOLD           RADIOLOGY  Personally reviewed by me  DX-CHEST-PORTABLE (1 VIEW)   Final Result         1.  No acute cardiopulmonary disease.            ED  "COURSE  Vitals:    08/01/22 0011 08/01/22 0027 08/01/22 0226 08/01/22 0426   BP: (!) 146/80  (!) 152/82 (!) 156/89   Pulse: 96  83 88   Resp: 18  20 18   Temp: 36.6 °C (97.8 °F)   36.9 °C (98.4 °F)   TempSrc: Temporal      SpO2: 95%  91% 96%   Weight:  65.8 kg (145 lb)     Height:  1.676 m (5' 6\")           Medications administered:  Medications - No data to display      Old records personally reviewed:  Patient was seen here 10 days ago for similar complaints.  He has also been seen 2 times earlier in the [facility] for similar complaints.  He was seen there in May for rib fractures after a fall.  Last stress test in our system was 2019.        MEDICAL DECISION MAKING  Patient with history of hypertension and diabetes and frequent visits for chest pain following rib fracture a few months ago who presents with chest pain and alcohol intoxication.  He is slightly hypertensive on arrival with otherwise reassuring vital signs.  His EKG is unchanged from prior without ischemia or arrhythmia.  Troponin is normal, clinically doubt ACS, pulmonary embolism.  His chest x-ray does not show new signs of trauma, pneumonia, pulmonary edema.  His labs are reassuring other than evidence of mild CKD which the patient has had in the past.  Symptoms are completely resolved at this time.  Will monitor here for sober reevaluation and likely discharge    4:30 AM - Upon reassessment, patient is resting comfortably with stable vital signs.  No new complaints at this time.  He is ambulatory with a steady gait.  Discussed results with patient and/or family as well as importance of primary care follow up.  Patient understands plan of care and strict return precautions for new or changing symptoms.         IMPRESSION  (R07.9) Chest pain, unspecified type  (F10.920) Alcoholic intoxication without complication (HCC)    Disposition: Discharge home, stable condition  Results, diagnoses, and treatment options were discussed with the " patient and/or family. Patient verbalized understanding of plan of care.    Patient referred to primary care provider for monitoring and treatment of blood pressure.      Discharge Medication List as of 8/1/2022  4:28 AM              Electronically signed by: Kenisha Shultz M.D., 8/1/2022 3:21 AM

## 2022-08-01 NOTE — DISCHARGE INSTRUCTIONS
You were seen in the Emergency Department for chest pain.    EKG, labs, chest xray were completed without significant acute abnormalities.    Please use 1,000mg of tylenol or 600mg of ibuprofen every 6 hours as needed for pain.    Please follow up with your primary care physician.    Return to the Emergency Department with worsening symptoms or other concerns.

## 2022-08-01 NOTE — ED NOTES
Pt ambulatory to the restroom with a steady gait. He is asking to leave now. Erp made aware and ok with pt being discharged with a ride.

## 2022-08-01 NOTE — ED TRIAGE NOTES
"Chief Complaint   Patient presents with   • Chest Pain     Pt reports left sided chest pain starting a few hours ago. Pt given 324mg of ASA by EMS with some relief.        58 yo male to triage for above complaint. When pt asked to elaborate on what pain feels like pt reports \"I don't know it just hurts\": Pt reports he normally goes to [facility] for evaluation for same symptoms but states \"they do not not help me\". Pt reports he drank 1/2 a pint of alcohol tonight and 2 beers.  EKG completed in triage. Protocol ordered.  GCS=15.        Pt educated on triage process. Pt encouraged to alert staff for any changes.     Patient and staff wearing appropriate PPE    BP (!) 146/80   Pulse 96   Temp 36.6 °C (97.8 °F) (Temporal)   Resp 18   Ht 1.676 m (5' 6\")   Wt 65.8 kg (145 lb)   SpO2 95%   BMI 23.40 kg/m²     "

## 2022-08-02 NOTE — ED PROVIDER NOTES
"ED Provider Note    CHIEF COMPLAINT  Chief Complaint   Patient presents with   • Alcohol Intoxication     Pt was found sitting on a bench at [address] \" unable to ambulate\" per EMS       HPI  Thaddeus Adan is a 57 y.o. male with history of diabetes and hypertension as well as alcohol abuse who presents to the emergency department with alcohol intoxication and anxiety.  The patient states he was sitting on a bench and called EMS today because he was unable to walk.  He tells me that he drank alcohol today and whenever he drinks alcohol he becomes anxious.  I actually saw the patient and discharged him early this morning as he was here for chest pain and alcohol intoxication.  The patient denies any complaints of pain at this time.  No recent falls.  He states that his family lives in the [facility] and he is here without them which causes him stress.    REVIEW OF SYSTEMS  See HPI for further details.   Positive for alcohol intoxication, anxiety  Negative for chest pain, headache, trouble breathing.    PAST MEDICAL HISTORY   has a past medical history of Diabetes (HCC) and Hypertension.    SOCIAL HISTORY  Social History     Tobacco Use   • Smoking status: Current Every Day Smoker     Packs/day: 0.25     Years: 25.00     Pack years: 6.25     Types: Cigarettes     Start date: 1995   • Smokeless tobacco: Never Used   Vaping Use   • Vaping Use: Not on file   Substance and Sexual Activity   • Alcohol use: Yes     Comment: \"1/4 of a pint\"   • Drug use: Not Currently   • Sexual activity: Not on file       SURGICAL HISTORY  patient denies any surgical history    CURRENT MEDICATIONS  Home Medications     Reviewed by Avtar Huff R.N. (Registered Nurse) on 08/01/22 at 2215  Med List Status: Partial   Medication Last Dose Status   amLODIPine (NORVASC) 10 MG Tab  Active   famotidine (PEPCID) 20 MG Tab  Active   glipiZIDE (GLUCOTROL) 5 MG Tab  Active   lidocaine (LIDODERM) 5 % Patch  Active   losartan (COZAAR) 100 MG " "Tab  Active   metformin (GLUCOPHAGE) 1000 MG tablet  Active   multivitamin (THERAGRAN) Tab  Active   omeprazole (PRILOSEC) 20 MG delayed-release capsule  Active                ALLERGIES  No Known Allergies    PHYSICAL EXAM  VITAL SIGNS: BP (!) 160/84   Pulse 97   Temp 36.1 °C (97 °F) (Temporal)   Resp 17   Ht 1.676 m (5' 6\")   Wt 65.8 kg (145 lb)   SpO2 94%   BMI 23.40 kg/m²    Constitutional: Disheveled, intoxicated male alert in no apparent distress.  HENT: Normocephalic, Atraumatic. Bilateral external ears normal. Nose normal. Moist mucous membranes.  Neck: Supple, full range of motion.  Eyes: Pupils are equal and reactive. Conjunctiva normal.   Heart: Regular rate and rhythm. No murmurs.    Lungs: No respiratory distress.  Normal work of breathing.  Clear to auscultation bilaterally.  Abdomen:  Soft, no distention. No tenderness to palpation  Skin: Warm, Dry. No rash.   Musculoskeletal: Atraumatic, no deformities noted.   Neurologic: Alert and oriented. Moving all extremities spontaneously  Psychiatric: Appears intoxicated otherwise difficult to assess      DIAGNOSTIC STUDIES        ED COURSE  Vitals:    08/01/22 2213 08/01/22 2214 08/01/22 2300 08/02/22 0000   BP:   (!) 171/89 (!) 160/84   Pulse:   88 97   Resp:   (!) 24 17   Temp:  36.1 °C (97 °F)     TempSrc:  Temporal     SpO2:   100% 94%   Weight: 65.8 kg (145 lb)      Height: 1.676 m (5' 6\")            Medications administered:  Medications - No data to display      Old records personally reviewed:  Patient is seen both here in [facility] for alcohol intoxication, chest pain, anxiety.  He was just discharged this morning after a visit for chest pain which was unremarkable.        MEDICAL DECISION MAKING  Patient presents to the emergency department with alcohol intoxication.  He is hiypertensive on arrival with otherwise normal vitals.  No focal neurologic deficits concerning for stroke.  No evidence of trauma on exam.  Patient requesting " to leave however still decently intoxicated, he will be monitored here for sober reevaluation.    After some time of observation, nursing staff notified me that the patient was able to elope from the emergency department prior to my reevaluation or discharge.  He was reportedly ambulatory with a steady gait.      IMPRESSION  (F10.920) Alcoholic intoxication without complication (HCC)    Disposition: Eloped  .      Discharge Medication List as of 8/2/2022 12:41 AM            Electronically signed by: Kenisha Shultz M.D., 8/1/2022 10:44 PM

## 2022-08-02 NOTE — ED TRIAGE NOTES
"Thaddeus Adan  Chief Complaint   Patient presents with   • Alcohol Intoxication     Pt was found sitting on a bench at [address] \" unable to ambulate\" per EMS     Pt BIBA for above CC, Per EMS pt was found sitting on a bench at [address] and told EMS that he was \"unable to move\"  Pt in gown, on monitor, chart up for ERP  "

## 2022-11-16 NOTE — ED PROVIDER NOTES
ED Provider Note     Scribed for Citlaly Brian D.O. by Kike Mckeon. 11/15/2022, 4:45 PM.     Primary care provider: Pcp Pt States None  Means of arrival: EMS         History obtained from: Nurse  History limited by: Alcohol Intoxication    CHIEF COMPLAINT  Chief Complaint   Patient presents with    ETOH Intoxication     BIB EMS for ETOH intoxication. Pt states he drank a liter of vodka today but only came to the hospital to have his vitals checked. Pt states he's a diabetic but stopped taking his Metformin because it gives him diarrhea. POC Glucose resulted 214 in ED. Pt unable to follow directions to perform a breathalyzer at this time.     HPI  Thaddeus Adan is a 57 y.o. male who presents to the emergency Department for alcohol intoxication onset prior to arrival. Per nursing notes, the patient drank a liter of vodka and presented to the ED to get his vitals checked. Nurse additionally notes the patient believes it's Sunday and is unsure where he came from. Denies fever. No alleviating or exacerbating factors reported. He has a history of diabetes but stopped taking Metformin because it caused him to have diarrhea.    HPI limited by: Alcohol Intoxication    REVIEW OF SYSTEMS  Pertinent positives include alcohol intoxication. Pertinent negatives include no fever.   See HPI for further details.    ROS limited by: Alcohol Intoxication    PAST MEDICAL HISTORY  Past Medical History:   Diagnosis Date    Diabetes (HCC)     Hypertension        FAMILY HISTORY  Family History   Problem Relation Age of Onset    Heart Disease Father        SOCIAL HISTORY  Social History     Tobacco Use    Smoking status: Every Day     Packs/day: 0.25     Years: 25.00     Pack years: 6.25     Types: Cigarettes     Start date: 1995    Smokeless tobacco: Never   Substance Use Topics    Alcohol use: Yes     Comment: a liter a day    Drug use: Not Currently      Social History     Substance and Sexual Activity   Drug Use Not Currently  "      SURGICAL HISTORY  History reviewed. No pertinent surgical history.    CURRENT MEDICATIONS  No current facility-administered medications for this encounter.    Current Outpatient Medications:     famotidine (PEPCID) 20 MG Tab, Take 1 Tablet by mouth every day., Disp: 30 Tablet, Rfl: 0    lidocaine (LIDODERM) 5 % Patch, Place 1 Patch on the skin every 24 hours. Apply to site of pain. Wear for 12 hours, then remove for 12 hours, Disp: 30 Patch, Rfl: 0    amLODIPine (NORVASC) 10 MG Tab, Take 10 mg by mouth every day., Disp: , Rfl:     glipiZIDE (GLUCOTROL) 5 MG Tab, Take 5 mg by mouth 2 times a day., Disp: , Rfl:     losartan (COZAAR) 100 MG Tab, Take 100 mg by mouth every day., Disp: , Rfl:     metformin (GLUCOPHAGE) 1000 MG tablet, Take 1,000 mg by mouth 2 times a day, with meals., Disp: , Rfl:     omeprazole (PRILOSEC) 20 MG delayed-release capsule, Take 20 mg by mouth every day., Disp: , Rfl:     multivitamin (THERAGRAN) Tab, Take 1 Tab by mouth every day., Disp: , Rfl:     ALLERGIES  No Known Allergies    PHYSICAL EXAM  VITAL SIGNS: BP (!) 143/73   Pulse 91   Temp 36.6 °C (97.9 °F) (Temporal)   Resp 16   Ht 1.676 m (5' 5.98\")   Wt 65.8 kg (145 lb 1 oz)   SpO2 100%   BMI 23.43 kg/m²     Constitutional: Patient intoxicated, sleeping, responds to painful stimulus, No acute distress.   HENT: Normocephalic, atraumatic. Oral mucosa is moist.   Eyes: 3 mm, PERRL, EOMI  Neck: Normal range of motion, nontender.  Skin: Warm, Dry, no contusions or abrasions.  Extremities: Moves all four extremities equally with no tenderness.   Neurologic: Sleeping, intoxicated. No motor or sensory deficits    DIAGNOSTICS/PROCEDURES    LABS  Results for orders placed or performed during the hospital encounter of 11/15/22   CBC WITH DIFFERENTIAL   Result Value Ref Range    WBC 7.1 4.8 - 10.8 K/uL    RBC 4.36 (L) 4.70 - 6.10 M/uL    Hemoglobin 14.3 14.0 - 18.0 g/dL    Hematocrit 42.1 42.0 - 52.0 %    MCV 96.6 81.4 - 97.8 fL    MCH " 32.8 27.0 - 33.0 pg    MCHC 34.0 33.7 - 35.3 g/dL    RDW 48.0 35.9 - 50.0 fL    Platelet Count 415 164 - 446 K/uL    MPV 8.5 (L) 9.0 - 12.9 fL    Neutrophils-Polys 61.10 44.00 - 72.00 %    Lymphocytes 21.00 (L) 22.00 - 41.00 %    Monocytes 8.40 0.00 - 13.40 %    Eosinophils 6.40 0.00 - 6.90 %    Basophils 2.00 (H) 0.00 - 1.80 %    Immature Granulocytes 1.10 (H) 0.00 - 0.90 %    Nucleated RBC 0.00 /100 WBC    Neutrophils (Absolute) 4.36 1.82 - 7.42 K/uL    Lymphs (Absolute) 1.50 1.00 - 4.80 K/uL    Monos (Absolute) 0.60 0.00 - 0.85 K/uL    Eos (Absolute) 0.46 0.00 - 0.51 K/uL    Baso (Absolute) 0.14 (H) 0.00 - 0.12 K/uL    Immature Granulocytes (abs) 0.08 0.00 - 0.11 K/uL    NRBC (Absolute) 0.00 K/uL   COMP METABOLIC PANEL   Result Value Ref Range    Sodium 137 135 - 145 mmol/L    Potassium 3.4 (L) 3.6 - 5.5 mmol/L    Chloride 98 96 - 112 mmol/L    Co2 22 20 - 33 mmol/L    Anion Gap 17.0 (H) 7.0 - 16.0    Glucose 196 (H) 65 - 99 mg/dL    Bun 24 (H) 8 - 22 mg/dL    Creatinine 2.23 (H) 0.50 - 1.40 mg/dL    Calcium 8.3 (L) 8.5 - 10.5 mg/dL    AST(SGOT) 148 (H) 12 - 45 U/L    ALT(SGPT) 44 2 - 50 U/L    Alkaline Phosphatase 178 (H) 30 - 99 U/L    Total Bilirubin 0.2 0.1 - 1.5 mg/dL    Albumin 3.6 3.2 - 4.9 g/dL    Total Protein 8.3 (H) 6.0 - 8.2 g/dL    Globulin 4.7 (H) 1.9 - 3.5 g/dL    A-G Ratio 0.8 g/dL   DIAGNOSTIC ALCOHOL   Result Value Ref Range    Diagnostic Alcohol 378.4 (H) <10.1 mg/dL   ESTIMATED GFR   Result Value Ref Range    GFR (CKD-EPI) 33 (A) >60 mL/min/1.73 m 2     Labs reviewed by me    COURSE & MEDICAL DECISION MAKING  Pertinent Labs & Imaging studies reviewed. (See chart for details)    4:45 PM - Patient seen and evaluated at bedside. Ordered for CBC w/ Diff and CMP to evaluate. Patient will be treated with IV Fluids for his symptoms.    5:38 PM - Ordered for Diagnostic Alcohol to evaluate.  Labs revealed blood alcohol of 0.378, alkaline phos is 178 SGOT 148, BUN and creatinine are 24 and 2.23, glucose  was 196 anion gap is 17.  White count is normal H&H is stable.  Patient awakened and wanted to go home.  I explained to him that he had abnormal laboratories and that we needed to hydrate him's more, check out his kidney functions and get his sugars under control.  He states the reason has not been taking his metformin is because it gives him too much diarrhea and that is why he is dehydrated.  He refuses to stay in the hospital.  He states he has to work tomorrow.  At this time he will need to follow-up with [facility] regarding his change in medications as he is already on glipizide and metformin.    7:19 PM - Per RN, the patient is able to ambulate and is requesting to be discharged.    7:41 PM - Patient was reevaluated at bedside. Discussed lab results which showed elevated creatinine levels and plan for admission. Patient refuses admission at this time.    HYDRATION: Based on the patient's presentation of Alcohol Intoxication and Dehydration the patient was given IV fluids. IV Hydration was used because oral hydration was not adequate alone. Upon recheck following hydration, the patient was improved.  I explained the possibilities of acute kidney injury, uncontrolled diabetes causing diabetic ketoacidosis and his need for decreasing his alcohol consumption.    The patient will return for new or worsening symptoms and is stable at the time of discharge.    DISPOSITION:  Patient will be discharged home in guarded condition.    FOLLOW UP:  [facility] ([facility]) - Primary Care and Family Medicine  [address] [place] [id number]  [phone]  Schedule an appointment as soon as possible for a visit in 1 day    FINAL IMPRESSION  1. Acute alcoholic intoxication without complication (HCC)    2. Chronic alcohol abuse    3. Chronic kidney disease, unspecified CKD stage    4. Dehydration    5. Type 2 diabetes mellitus with hyperglycemia, without long-term current use of insulin (HCC)     6. Patient's other noncompliance with medication regimen       Kike REEVES (Scribe), am scribing for, and in the presence of, Citlaly Brian D.O..    Electronically signed by: Kike Mckeon (Scribe), 11/15/2022    Citlaly REEVES D.O. personally performed the services described in this documentation, as scribed by Kike Mckeon in my presence, and it is both accurate and complete.    Note the note accurately reflects work and decisions made by me.  Citlaly Brian D.O.  11/16/2022  12:43 AM

## 2022-11-16 NOTE — ED NOTES
"Pt discharged to lobby with steady gait, AOx4. Pt provided MTM information for ride home. IV discontinued and gauze placed, pt in possession of belongings. Pt provided discharge education and information pertaining to medications and follow up appointments. Pt received copy of discharge instructions and verbalized understanding. BP (!) 174/102   Pulse 98   Temp 36.4 °C (97.5 °F) (Temporal)   Resp 15   Ht 1.676 m (5' 5.98\")   Wt 65.8 kg (145 lb 1 oz)   SpO2 94%   BMI 23.43 kg/m²   "

## 2022-11-16 NOTE — ED TRIAGE NOTES
Chief Complaint   Patient presents with    ETOH Intoxication     BIB EMS for ETOH intoxication. Pt states he drank a liter of vodka today but only came to the hospital to have his vitals checked. Pt states he's a diabetic but stopped taking his Metformin because it gives him diarrhea. POC Glucose resulted 214 in ED. Pt unable to follow directions to perform a breathalyzer at this time.

## 2022-11-16 NOTE — DISCHARGE INSTRUCTIONS
You need to get some help for your alcohol abuse!!  This makes it hard to control your sugars as alcohol is full of sugar.  Need to follow-up with your primary care provider at [facility] to find a different medication that will help control your sugars and stop the diarrhea.  Increase fluids over the next 24 hours, this is strongly affecting your kidneys.  Return if any problems or worsening
